# Patient Record
Sex: MALE | Race: OTHER | Employment: FULL TIME | URBAN - METROPOLITAN AREA
[De-identification: names, ages, dates, MRNs, and addresses within clinical notes are randomized per-mention and may not be internally consistent; named-entity substitution may affect disease eponyms.]

---

## 2017-02-21 ENCOUNTER — OFFICE VISIT (OUTPATIENT)
Dept: SURGERY | Age: 32
End: 2017-02-21

## 2017-02-21 VITALS
BODY MASS INDEX: 37.66 KG/M2 | DIASTOLIC BLOOD PRESSURE: 74 MMHG | SYSTOLIC BLOOD PRESSURE: 129 MMHG | HEART RATE: 56 BPM | HEIGHT: 71 IN | OXYGEN SATURATION: 100 % | WEIGHT: 269 LBS

## 2017-02-21 DIAGNOSIS — Z98.84 S/P BARIATRIC SURGERY: ICD-10-CM

## 2017-02-21 DIAGNOSIS — K90.9 INTESTINAL MALABSORPTION, UNSPECIFIED TYPE: Primary | ICD-10-CM

## 2017-02-21 DIAGNOSIS — K21.9 GASTROESOPHAGEAL REFLUX DISEASE WITHOUT ESOPHAGITIS: ICD-10-CM

## 2017-02-21 RX ORDER — OMEPRAZOLE 40 MG/1
40 CAPSULE, DELAYED RELEASE ORAL DAILY
Qty: 30 CAP | Refills: 3 | Status: SHIPPED | OUTPATIENT
Start: 2017-02-21 | End: 2017-08-11 | Stop reason: SDUPTHER

## 2017-02-21 RX ORDER — CYANOCOBALAMIN 1000 UG/ML
1000 INJECTION, SOLUTION INTRAMUSCULAR; SUBCUTANEOUS
Qty: 3 ML | Refills: 4 | Status: SHIPPED | OUTPATIENT
Start: 2017-02-21 | End: 2019-06-19 | Stop reason: SDUPTHER

## 2017-02-21 NOTE — PATIENT INSTRUCTIONS
I will call you about labs  Restart B12 injections monthly. Keep eating regularly. Body Mass Index: Care Instructions  Your Care Instructions    Body mass index (BMI) can help you see if your weight is raising your risk for health problems. It uses a formula to compare how much you weigh with how tall you are. A BMI between 18.5 and 24.9 is considered healthy. A BMI between 25 and 29.9 is considered overweight. A BMI of 30 or higher is considered obese. If your BMI is in the normal range, it means that you have a lower risk for weight-related health problems. If your BMI is in the overweight or obese range, you may be at increased risk for weight-related health problems, such as high blood pressure, heart disease, stroke, arthritis or joint pain, and diabetes. BMI is just one measure of your risk for weight-related health problems. You may be at higher risk for health problems if you are not active, you eat an unhealthy diet, or you drink too much alcohol or use tobacco products. Follow-up care is a key part of your treatment and safety. Be sure to make and go to all appointments, and call your doctor if you are having problems. It's also a good idea to know your test results and keep a list of the medicines you take. How can you care for yourself at home? · Practice healthy eating habits. This includes eating plenty of fruits, vegetables, whole grains, lean protein, and low-fat dairy. · Get at least 30 minutes of exercise 5 days a week or more. Brisk walking is a good choice. You also may want to do other activities, such as running, swimming, cycling, or playing tennis or team sports. · Do not smoke. Smoking can increase your risk for health problems. If you need help quitting, talk to your doctor about stop-smoking programs and medicines. These can increase your chances of quitting for good. · Limit alcohol to 2 drinks a day for men and 1 drink a day for women.  Too much alcohol can cause health problems. If you have a BMI higher than 25  · Your doctor may do other tests to check your risk for weight-related health problems. This may include measuring the distance around your waist. A waist measurement of more than 40 inches in men or 35 inches in women can increase the risk of weight-related health problems. · Talk with your doctor about steps you can take to stay healthy or improve your health. You may need to make lifestyle changes to lose weight and stay healthy, such as changing your diet and getting regular exercise. Where can you learn more? Go to http://niko-jasmin.info/. Enter S176 in the search box to learn more about \"Body Mass Index: Care Instructions. \"  Current as of: February 16, 2016  Content Version: 11.1  © 1939-7695 [employer], [employer]. Care instructions adapted under license by [employer] (which disclaims liability or warranty for this information). If you have questions about a medical condition or this instruction, always ask your healthcare professional. [place] [address] any warranty or liability for your use of this information.

## 2017-02-21 NOTE — PROGRESS NOTES
Subjective: Ivy Bui  is a 27 y.o. male who presents for follow-up about 11 months following laparoscopic sleeve gastrectomy. Surgery related complication: NA. He has lost a total of 166 pounds since surgery. Body mass index is 37.52 kg/(m^2). [place] Po Loss of EBW is 60%. The patient presents today to assess their progress toward their weight loss goal & to address any issues that may be present:   He is tolerating solid foods without difficulty and denies vomiting and abdominal pain. Remains on omeprazole - trial off resulted in return of symptoms. Fluid intake:  good                              Protein intake:  good  Eating much more regularly. The patient is taking MVI, but no B12. The patient's exercise level: moderately active. Active job @ [employer]. Changes in his medical history and medications have been reviewed:  Continues with back pain - ortho has sent him to pain management. Had steroid injection in back last week. Uses flexeril prn. Patient Active Problem List   Diagnosis Code    Morbid obesity with body mass index of 60.0-69.9 in adult ([facility] E66.01, Z68.44    Back pain M54.9    Smoking history Z87.891    Strabismus H50.9    GERD (gastroesophageal reflux disease) K21.9    Morbid obesity with BMI of 60.0-69.9, adult ([facility] E66.01, Z68.44    S/P bariatric surgery Z98.84    Intestinal malabsorption K90.9     Past Medical History:   Diagnosis Date    Back pain     daily    GERD (gastroesophageal reflux disease)     Intestinal malabsorption     Morbid obesity with body mass index of 60.0-69.9 in adult ([facility]     S/P bariatric surgery     Smoking history     quit 2013    Strabismus      Past Surgical History:   Procedure Laterality Date    HX GI      gastric sleeve    HX WISDOM TEETH EXTRACTION       Current Outpatient Prescriptions   Medication Sig Dispense Refill    CYCLOBENZAPRINE HCL (FLEXERIL PO) Take 10 mg by mouth as needed.       Syringe with Needle, Disp, 3 mL 25 x 5/8\" syrg 1 mL by SubCUTAneous route every thirty (30) days. 15 Syringe 0    cyanocobalamin (VITAMIN B12) 1,000 mcg/mL injection 1 mL by SubCUTAneous route every thirty (30) days. Indications: PREVENTION OF VITAMIN B12 DEFICIENCY 3 mL 4    omeprazole (PRILOSEC) 40 mg capsule Take 1 Cap by mouth daily. Indications: GASTROESOPHAGEAL REFLUX, HEARTBURN 30 Cap 3    multivitamin (ONE A DAY) tablet Take 1 Tab by mouth daily. Review of Systems:  General - Denies fatigue, fever, chills  Cardiac - Denies chest pain, palpitations, shortness of breath  Pulmonary - Denies shortness of breath, productive cough  GI - as noted above  Musculoskeletal - continues with back pain  Hematologic - Denies abnormal bleeding, bruising  Neurologic -  Denies weakness, paralysis, numbness, tingling    Objective:     Visit Vitals    /74 (BP 1 Location: Left arm, BP Patient Position: Sitting)    Pulse (!) 56    Ht 5' 11\" (1.803 m)    Wt 122 kg (269 lb)    SpO2 100%    BMI 37.52 kg/m2        Physical Exam:    General:  alert, cooperative, no distress, appears stated age   Lungs:   clear to auscultation bilaterally. Respiratory effort appropriate. Heart:  Regular rate and rhythm   Abdomen:   abdomen is soft without  tenderness, masses, organomegaly or guarding; Active bowel sounds all 4 quadrants. No hernia present. Incisions:  healed       Labs:     No 6m labs. Got bariatric labs drawn yesterday @ [facility] - no results yet. Assessment:     1. History of Morbid obesity, status post  laparoscopic sleeve gastrectomy. Doing well - with excellent wt loss and improved intake, but not taking B12.  2.  GERD - controlled with omeprazole    Plan:       1. Today the patient & I discussed their weight loss and reviewed their diet - including how appropriate their food choices are.   2. To continue focus on hydration. 3. Reminded to measure portions, continue high protein, low carbohydrate diet.  Reminded to continue to eat regularly. Dietician support offered. 4. Reminded of importance of vitamin supplements. To restart B12 injections monthly. E-prescribed. 5. To continue to increase activity. 6. Encouraged attendance @ support group  7. To continue current rx. To continue follow-up with PCP. 8. We will call with lab results when available. 9. I have discussed this plan and education material with patient. Pt verbalized understanding. 10. To follow-up in 3 month(s). To call if questions/concerns prior to office visit. 11. Total time spent with pt - 25 minutes    Mr. Yoly Ross has a reminder for a \"due or due soon\" health maintenance. I have asked that he contact his primary care provider for follow-up on this health maintenance.

## 2017-02-21 NOTE — MR AVS SNAPSHOT
Visit Information Date & Time Provider Department Dept. Phone Encounter #  
 2/21/2017 10:00 AM Tevin Buenrostro NP [facility] Jaky Yoon [id number] Follow-up Instructions Return in about 3 months (around 5/21/2017). Upcoming Health Maintenance Date Due DTaP/Tdap/Td series (1 - Tdap) 10/11/2005 INFLUENZA AGE 9 TO ADULT 8/1/2016 Allergies as of 2/21/2017  Review Complete On: 2/21/2017 By: Tevin Buenrostro NP No Known Allergies Current Immunizations  Never Reviewed No immunizations on file. Not reviewed this visit Vitals BP Pulse Height(growth percentile) Weight(growth percentile) SpO2 BMI  
 129/74 (BP 1 Location: Left arm, BP Patient Position: Sitting) (!) 56 5' 11\" (1.803 m) 269 lb (122 kg) 100% 37.52 kg/m2 Smoking Status Former Smoker Vitals History BMI and BSA Data Body Mass Index Body Surface Area  
 37.52 kg/m 2 2.47 m 2 Preferred Pharmacy Pharmacy Name Phone [id number], [address] [place] [address] Your Updated Medication List  
  
   
This list is accurate as of: 2/21/17 11:08 AM.  Always use your most recent med list.  
  
  
  
  
 cyanocobalamin 1,000 mcg/mL injection Commonly known as:  VITAMIN B12  
1 mL by SubCUTAneous route every thirty (30) days. Indications: PREVENTION OF VITAMIN B12 DEFICIENCY FLEXERIL PO Take 10 mg by mouth as needed. multivitamin tablet Commonly known as:  ONE A DAY Take 1 Tab by mouth daily. omeprazole 40 mg capsule Commonly known as:  PRILOSEC Take 1 Cap by mouth daily. Indications: GASTROESOPHAGEAL REFLUX, HEARTBURN Syringe with Needle (Disp) 3 mL 25 x 5/8\" Syrg 1 mL by SubCUTAneous route every thirty (30) days. Prescriptions Sent to Pharmacy Refills  Syringe with Needle, Disp, 3 mL 25 x 5/8\" syrg 0  
 Si mL by SubCUTAneous route every thirty (30) days. Class: Normal  
 [facility] [address] [place] [place] [address] [phone] Route: SubCUTAneous  
 cyanocobalamin (VITAMIN B12) 1,000 mcg/mL injection 4 Si mL by SubCUTAneous route every thirty (30) days. Indications: PREVENTION OF VITAMIN B12 DEFICIENCY Class: Normal  
 Pharmacy: [address] [place] [address] #: [phone] Route: SubCUTAneous  
 omeprazole (PRILOSEC) 40 mg capsule 3 Sig: Take 1 Cap by mouth daily. Indications: GASTROESOPHAGEAL REFLUX, HEARTBURN Class: Normal  
 Pharmacy: [address] [place] [address] #: [phone] Route: Oral  
  
Follow-up Instructions Return in about 3 months (around 2017). Patient Instructions I will call you about labs Restart B12 injections monthly. Keep eating regularly. Body Mass Index: Care Instructions Your Care Instructions Body mass index (BMI) can help you see if your weight is raising your risk for health problems. It uses a formula to compare how much you weigh with how tall you are. A BMI between 18.5 and 24.9 is considered healthy. A BMI between 25 and 29.9 is considered overweight. A BMI of 30 or higher is considered obese. If your BMI is in the normal range, it means that you have a lower risk for weight-related health problems. If your BMI is in the overweight or obese range, you may be at increased risk for weight-related health problems, such as high blood pressure, heart disease, stroke, arthritis or joint pain, and diabetes. BMI is just one measure of your risk for weight-related health problems. You may be at higher risk for health problems if you are not active, you eat an unhealthy diet, or you drink too much alcohol or use tobacco products. Follow-up care is a key part of your treatment and safety.  Be sure to make and go to all appointments, and call your doctor if you are having problems. It's also a good idea to know your test results and keep a list of the medicines you take. How can you care for yourself at home? · Practice healthy eating habits. This includes eating plenty of fruits, vegetables, whole grains, lean protein, and low-fat dairy. · Get at least 30 minutes of exercise 5 days a week or more. Brisk walking is a good choice. You also may want to do other activities, such as running, swimming, cycling, or playing tennis or team sports. · Do not smoke. Smoking can increase your risk for health problems. If you need help quitting, talk to your doctor about stop-smoking programs and medicines. These can increase your chances of quitting for good. · Limit alcohol to 2 drinks a day for men and 1 drink a day for women. Too much alcohol can cause health problems. If you have a BMI higher than 25 · Your doctor may do other tests to check your risk for weight-related health problems. This may include measuring the distance around your waist. A waist measurement of more than 40 inches in men or 35 inches in women can increase the risk of weight-related health problems. · Talk with your doctor about steps you can take to stay healthy or improve your health. You may need to make lifestyle changes to lose weight and stay healthy, such as changing your diet and getting regular exercise. Where can you learn more? Go to http://niko-jasmin.info/. Enter S176 in the search box to learn more about \"Body Mass Index: Care Instructions. \" Current as of: February 16, 2016 Content Version: 11.1 © 9252-7570 Healthwise, Incorporated. Care instructions adapted under license by [employer] (which disclaims liability or warranty for this information).  If you have questions about a medical condition or this instruction, always ask your healthcare professional. [employer] disclaims any warranty or liability for your use of this information. Introducing hospitals & HEALTH SERVICES! Dear Abigail Weber: Thank you for requesting a Shareholder InSite account. Our records indicate that you already have an active Shareholder InSite account. You can access your account anytime at [email or web address] Did you know that you can access your hospital and ER discharge instructions at any time in Shareholder InSite? You can also review all of your test results from your hospital stay or ER visit. Additional Information If you have questions, please visit the Frequently Asked Questions section of the Shareholder InSite website at [email or web address] Remember, Shareholder InSite is NOT to be used for urgent needs. For medical emergencies, dial 911. Now available from your iPhone and Android! Please provide this summary of care documentation to your next provider. Your primary care clinician is listed as Jessi Yo. If you have any questions after today's visit, please call [phone].

## 2017-02-27 ENCOUNTER — TELEPHONE (OUTPATIENT)
Dept: SURGERY | Age: 32
End: 2017-02-27

## 2017-03-03 ENCOUNTER — TELEPHONE (OUTPATIENT)
Dept: SURGERY | Age: 32
End: 2017-03-03

## 2017-03-03 DIAGNOSIS — K90.9 INTESTINAL MALABSORPTION, UNSPECIFIED TYPE: ICD-10-CM

## 2017-03-03 DIAGNOSIS — Z98.84 S/P BARIATRIC SURGERY: ICD-10-CM

## 2017-03-03 RX ORDER — ERGOCALCIFEROL 1.25 MG/1
50000 CAPSULE ORAL 2 TIMES WEEKLY
Qty: 24 CAP | Refills: 1 | Status: SHIPPED | OUTPATIENT
Start: 2017-03-03 | End: 2017-11-11 | Stop reason: SDUPTHER

## 2017-05-22 ENCOUNTER — OFFICE VISIT (OUTPATIENT)
Dept: SURGERY | Age: 32
End: 2017-05-22

## 2017-05-22 VITALS
SYSTOLIC BLOOD PRESSURE: 113 MMHG | OXYGEN SATURATION: 100 % | BODY MASS INDEX: 34.58 KG/M2 | HEART RATE: 67 BPM | DIASTOLIC BLOOD PRESSURE: 58 MMHG | WEIGHT: 247 LBS | RESPIRATION RATE: 16 BRPM | HEIGHT: 71 IN

## 2017-05-22 DIAGNOSIS — Z98.84 S/P BARIATRIC SURGERY: ICD-10-CM

## 2017-05-22 DIAGNOSIS — K90.9 INTESTINAL MALABSORPTION, UNSPECIFIED TYPE: Primary | ICD-10-CM

## 2017-05-22 RX ORDER — CYANOCOBALAMIN 1000 UG/ML
1000 INJECTION, SOLUTION INTRAMUSCULAR; SUBCUTANEOUS ONCE
Qty: 6 VIAL | Refills: 1 | Status: SHIPPED | OUTPATIENT
Start: 2017-05-22 | End: 2017-05-22

## 2017-05-22 RX ORDER — NYSTATIN 100000 U/G
CREAM TOPICAL AS NEEDED
Qty: 15 G | Refills: 1 | Status: SHIPPED | OUTPATIENT
Start: 2017-05-22 | End: 2017-09-07

## 2017-05-22 NOTE — PROGRESS NOTES
1+ year follow-up    Subjective: Maira Darden  is a 27 y.o. male who presents for follow-up about 14 months following laparoscopic sleeve gastrectomy. He has lost a total of 188 pounds since surgery. Body mass index is 34.45 kg/(m^2). . EBWL is (68%). The patient presents today to assess their progress toward their goal of weight loss and to address any issues that may be present. Today the patient and I have reviewed their diet and how appropriate their food choices are. The following issues have been identified - pt here with C/O rash under pannus. Pain assessment - 0/10  . Surgery related complication: NA       He reports a worsening rash under pannus and denies vomiting, abdominal pain, diarrhea and difficulty breathing. The patient's exercise level: moderately active. Changes in his medical history and medications have been reviewed. Patient Active Problem List   Diagnosis Code    Morbid obesity with body mass index of 60.0-69.9 in adult ([facility] E66.01, Z68.44    Back pain M54.9    Smoking history Z87.891    Strabismus H50.9    GERD (gastroesophageal reflux disease) K21.9    Morbid obesity with BMI of 60.0-69.9, adult ([facility] E66.01, Z68.44    S/P bariatric surgery Z98.84    Intestinal malabsorption K90.9     Past Medical History:   Diagnosis Date    Back pain     daily    GERD (gastroesophageal reflux disease)     Intestinal malabsorption     Morbid obesity with body mass index of 60.0-69.9 in adult ([facility]     S/P bariatric surgery     Smoking history     quit 2013    Strabismus      Past Surgical History:   Procedure Laterality Date    HX GI      gastric sleeve    HX WISDOM TEETH EXTRACTION       Current Outpatient Prescriptions   Medication Sig Dispense Refill    BIOTIN PO Take  by mouth.  TRAMADOL HCL (TRAMADOL PO) Take  by mouth.  nystatin (MYCOSTATIN) topical cream Apply  to affected area as needed for Skin Irritation.  15 g 1    Syringe with Needle, Disp, 3 mL 25 x 5/8\" syrg 1 Units by Does Not Apply route every month. 6 Syringe 1    cyanocobalamin (VITAMIN B12) 1,000 mcg/mL injection 1 mL by IntraMUSCular route once for 1 dose. 6 Vial 1    ergocalciferol (VITAMIN D2) 50,000 unit capsule Take 1 Cap by mouth two (2) times a week for 180 days. Indications: VITAMIN D DEFICIENCY 24 Cap 1    CYCLOBENZAPRINE HCL (FLEXERIL PO) Take 10 mg by mouth as needed.  Syringe with Needle, Disp, 3 mL 25 x 5/8\" syrg 1 mL by SubCUTAneous route every thirty (30) days. 15 Syringe 0    cyanocobalamin (VITAMIN B12) 1,000 mcg/mL injection 1 mL by SubCUTAneous route every thirty (30) days. Indications: PREVENTION OF VITAMIN B12 DEFICIENCY 3 mL 4    omeprazole (PRILOSEC) 40 mg capsule Take 1 Cap by mouth daily. Indications: GASTROESOPHAGEAL REFLUX, HEARTBURN 30 Cap 3    multivitamin (ONE A DAY) tablet Take 1 Tab by mouth daily.          Review of Symptoms:     General - No history or complaints of unexpected fever or chills  Head/Neck - No history or complaints of headache or dizziness  Cardiac - No history or complaints of chest pain, palpitations, or shortness of breath  Pulmonary - No history or complaints of shortness of breath or productive cough  Gastrointestinal - as noted above  Genitourinary - No history or complaints of hematuria/dysuria or renal lithiasis  Musculoskeletal - No history or complaints of joint  muscular weakness  Hematologic - No history of any bleeding episodes  Neurologic - No history or complaints of  migraine headaches or neurologic symptoms    Objective:     Visit Vitals    /58 (BP 1 Location: Left arm, BP Patient Position: Sitting)    Pulse 67    Resp 16    Ht 5' 11\" (1.803 m)    Wt 112 kg (247 lb)    SpO2 100%    BMI 34.45 kg/m2        Physical Exam:    General:  alert, cooperative, no distress, appears stated age   Lungs:   clear to auscultation bilaterally   Heart:  Regular rate and rhythm   Abdomen:   Noninfected rash under entire pannus without evidence of skin breakdown, abdomen is soft without significant tenderness, masses, organomegaly or guarding; Incisions: healing well, no significant drainage         Labs:     No results found for this or any previous visit (from the past 2016 hour(s)). Assessment:     1. History of Morbid obesity, status post  laparoscopic sleeve gastrectomy. Doing well, no concerns. He desires another 25 lb of weight loss. Labs from earlier this year reviewed via care everywhere from [facility]. He is already addressing his low Vit D. We have discussed his rash and treatment options. Nystatin cream given today. Discussed the possible need for plastic surgery in the future. B12 injections sent to pharmacy     Plan:     1. Remember to measure portions, continue low carbohydrate diet  2. Reviewed labs and appropriate changes made to vitamin regimen  3. Remember vitamin supplements - The importance of such was discussed regarding the malabsorptive issues that the surgery creates  4. Exercise regimen appears adequate. 5. Attend support group  6. Follow-up in 10 month(s). 7. The patient understands the plan of action  8. Total time spent with the patient 30 minutes.

## 2017-07-25 ENCOUNTER — OFFICE VISIT (OUTPATIENT)
Dept: SURGERY | Age: 32
End: 2017-07-25

## 2017-07-25 VITALS
HEIGHT: 71 IN | HEART RATE: 48 BPM | WEIGHT: 245.9 LBS | OXYGEN SATURATION: 100 % | SYSTOLIC BLOOD PRESSURE: 121 MMHG | DIASTOLIC BLOOD PRESSURE: 58 MMHG | BODY MASS INDEX: 34.43 KG/M2

## 2017-07-25 DIAGNOSIS — K21.9 GASTROESOPHAGEAL REFLUX DISEASE WITHOUT ESOPHAGITIS: ICD-10-CM

## 2017-07-25 DIAGNOSIS — Z98.84 S/P BARIATRIC SURGERY: ICD-10-CM

## 2017-07-25 DIAGNOSIS — R23.8 SKIN IRRITATION: ICD-10-CM

## 2017-07-25 DIAGNOSIS — E55.9 HYPOVITAMINOSIS D: ICD-10-CM

## 2017-07-25 DIAGNOSIS — K90.9 INTESTINAL MALABSORPTION, UNSPECIFIED TYPE: Primary | ICD-10-CM

## 2017-07-25 RX ORDER — TIZANIDINE 4 MG/1
4 TABLET ORAL
COMMUNITY
Start: 2017-07-18 | End: 2017-09-07

## 2017-07-25 RX ORDER — IBUPROFEN 200 MG
CAPSULE ORAL DAILY
COMMUNITY
End: 2018-01-04

## 2017-07-25 RX ORDER — NYSTATIN 100000 [USP'U]/G
POWDER TOPICAL 4 TIMES DAILY
Qty: 30 G | Refills: 5 | Status: SHIPPED | OUTPATIENT
Start: 2017-07-25 | End: 2017-09-07

## 2017-07-25 NOTE — PROGRESS NOTES
Subjective: Magen Moore  is a 27 y.o. male who presents for follow-up about 16 months following laparoscopic sleeve gastrectomy. Surgery related complication: NA. He has lost a total of 190 pounds since surgery. Body mass index is 34.3 kg/(m^2). [place] Loss of EBW is 69%. The patient presents today to assess their progress toward their weight loss goal & to address any issues that may be present:   He continues to have issues with skin irritation under his abdominal pannus and around navel despite use of nystatin cream. Wondering if he can also try nystatin powder as he works outside often and sweats a lot - states that the cream gets runny. He is tolerating solid foods without difficulty and denies vomiting and abdominal pain. Omeprazole effective in controlling reflux symptoms. Trial off resulted in return of symptoms. Fluid intake:  good                              Protein intake:  good - food + protein donn. Eating regularly. The patient is taking recommended vitamins. The patient's exercise level: moderately active. Changes in his medical history and medications have been reviewed:  Continues under the care of pain management for his back pain. Does desire abdominoplasty in future.     Patient Active Problem List   Diagnosis Code    Morbid obesity with body mass index of 60.0-69.9 in adult ([facility] E66.01, Z68.44    Back pain M54.9    Smoking history Z87.891    Strabismus H50.9    GERD (gastroesophageal reflux disease) K21.9    Morbid obesity with BMI of 60.0-69.9, adult ([facility] E66.01, Z68.44    S/P bariatric surgery Z98.84    Intestinal malabsorption K90.9     Past Medical History:   Diagnosis Date    Back pain     daily    GERD (gastroesophageal reflux disease)     Intestinal malabsorption     Morbid obesity with body mass index of 60.0-69.9 in adult [facility])     S/P bariatric surgery     Smoking history     quit 2013    Strabismus      Past Surgical History:   Procedure Laterality Date    HX GI      gastric sleeve    HX WISDOM TEETH EXTRACTION       Current Outpatient Prescriptions   Medication Sig Dispense Refill    calcium citrate 200 mg (950 mg) tablet Take  by mouth daily.  tiZANidine (ZANAFLEX) 4 mg tablet 4 mg.  nystatin (MYCOSTATIN) powder Apply  to affected area four (4) times daily. 30 g 5    BIOTIN PO Take  by mouth.  TRAMADOL HCL (TRAMADOL PO) Take  by mouth.  nystatin (MYCOSTATIN) topical cream Apply  to affected area as needed for Skin Irritation. 15 g 1    Syringe with Needle, Disp, 3 mL 25 x 5/8\" syrg 1 Units by Does Not Apply route every month. 6 Syringe 1    ergocalciferol (VITAMIN D2) 50,000 unit capsule Take 1 Cap by mouth two (2) times a week for 180 days. Indications: VITAMIN D DEFICIENCY 24 Cap 1    Syringe with Needle, Disp, 3 mL 25 x 5/8\" syrg 1 mL by SubCUTAneous route every thirty (30) days. 15 Syringe 0    cyanocobalamin (VITAMIN B12) 1,000 mcg/mL injection 1 mL by SubCUTAneous route every thirty (30) days. Indications: PREVENTION OF VITAMIN B12 DEFICIENCY 3 mL 4    omeprazole (PRILOSEC) 40 mg capsule Take 1 Cap by mouth daily. Indications: GASTROESOPHAGEAL REFLUX, HEARTBURN 30 Cap 3    multivitamin (ONE A DAY) tablet Take 1 Tab by mouth daily. Review of Systems:  General - Denies fatigue, fever, chills  Cardiac - Denies chest pain, palpitations, shortness of breath  Pulmonary - Denies shortness of breath, productive cough  GI - as noted above  Hematologic - Denies abnormal bleeding, bruising  Neurologic -  Denies weakness, paralysis, numbness, tingling    Objective:     Visit Vitals    /58 (BP 1 Location: Left arm, BP Patient Position: Sitting)    Pulse (!) 48    Ht 5' 11\" (1.803 m)    Wt 111.5 kg (245 lb 14.4 oz)    SpO2 100%    BMI 34.3 kg/m2        Physical Exam:    General:  alert, cooperative, no distress, appears stated age   Lungs:   clear to auscultation bilaterally.   Respiratory effort appropriate. Heart:  Regular rate and rhythm   Abdomen:   abdomen is soft without  tenderness, masses, organomegaly or guarding; Active bowel sounds all 4 quadrants. No hernia present. Both darkened and reddened skin noted under his abd pannus and around navel. No current breakdown noted. Incisions:  healed       Labs:     Yearly bariatric labs of 2/20/17 reviewed. Vit D - 18.5. Currently on replacement. Assessment:     1. History of Morbid obesity, status post  laparoscopic sleeve gastrectomy. Doing well with good wt loss  2. Skin irritation - persistent  3. GERD - controlled  4. Hypovit D - on replacement    Plan:       1. Today the patient & I discussed their weight loss and reviewed their diet - including how appropriate their food choices are.   2. To continue focus on hydration. 3. Reminded to measure portions, continue high protein, low carbohydrate diet. Reminded to eat regularly. Dietician support offered. 4. Reminded of importance of vitamin supplements. 5. To continue to increase activity. 6. Discussed his skin irritation. Will also try nystatin powder - e-prescribed. 7. Encouraged attendance @ support group  8. To continue current rx. To continue follow-up with PCP, specialists. May also need to see derm. 9. I have discussed this plan and education material with patient. Pt verbalized understanding. 10. To follow-up in 2 month(s). To call if questions/concerns prior to office visit. 11. Total time spent with pt - 25 minutes    Mr. Tosha Adrian has a reminder for a \"due or due soon\" health maintenance. I have asked that he contact his primary care provider for follow-up on this health maintenance.

## 2017-07-25 NOTE — PATIENT INSTRUCTIONS
Continue to monitor carbohydrate and protein intake. Eat regularly. Remember to stay hydrated  Continue to take vitamins  Continue to work towards exercise goals  Continue regular follow-up with PCP  Call office with any future questions or concerns         Walking for Exercise: Care Instructions  Your Care Instructions    Walking is one of the easiest ways to get the exercise you need for good health. A brisk, 30-minute walk each day can help you feel better and have more energy. It can help you lower your risk of disease. Walking can help you keep your bones strong and your heart healthy. Check with your doctor before you start a walking plan if you have heart problems, other health issues, or you have not been active in a long time. Follow your doctor's instructions for safe levels of exercise. Follow-up care is a key part of your treatment and safety. Be sure to make and go to all appointments, and call your doctor if you are having problems. It's also a good idea to know your test results and keep a list of the medicines you take. How can you care for yourself at home? Getting started  · Start slowly and set a short-term goal. For example, walk for 5 or 10 minutes every day. · Bit by bit, increase the amount you walk every day. Try for at least 30 minutes on most days of the week. You also may want to swim, bike, or do other activities. · If finding enough time is a problem, it is fine to be active in blocks of 10 minutes or more throughout your day and week. · To get the heart-healthy benefits of walking, you need to walk briskly enough to increase your heart rate and breathing, but not so fast that you cannot talk comfortably. · Wear comfortable shoes that fit well and provide good support for your feet and ankles. Staying with your plan  · After you've made walking a habit, set a longer-term goal. You may want to set a goal of walking briskly for longer or walking farther.  Experts say to do 2½ hours of moderate activity a week. A faster heartbeat is what defines moderate-level activity. · To stay motivated, walk with friends, coworkers, or pets. · Use a phone reilly or pedometer to track your steps each day. Set a goal to increase your steps. Once you get there, set a higher goal. Aim for 10,000 steps a day. · If the weather keeps you from walking outside, go for walks at the mall with a friend. Local schools and churches may have indoor gyms where you can walk. Fitting a walk into your workday  · Park several blocks away from work, or get off the bus a few stops early. · Use the stairs instead of the elevator, at least for a few floors. · Suggest holding meetings with colleagues during a walk inside or outside the building. · Use the restroom that is the farthest from your desk or workstation. · Use your morning and afternoon breaks to take quick 15-minute walks. Staying safe  · Know your surroundings. Walk in a well-lighted, safe place. If it is dark, walk with a partner. Wear light-colored clothing. If you can, buy a vest or jacket that reflects light. · Carry a cell phone for emergencies. · Drink plenty of water. Take a water bottle with you when you walk. This is very important if it is hot out. · Be careful not to slip on wet or icy ground. You can buy \"grippers\" for your shoes to help keep you from slipping. · Pay attention to your walking surface. Use sidewalks and paths. · If you have breathing problems like asthma or COPD, ask your doctor when it is safe for you to walk outdoors. Cold, dry air, smog, pollen, or other things in the air could cause breathing problems. Where can you learn more? Go to http://niko-jasmin.info/. Enter R159 in the search box to learn more about \"Walking for Exercise: Care Instructions. \"  Current as of: March 13, 2017  Content Version: 11.3  © 2391-1607 [employer].  Care instructions adapted under license by [employer] (which disclaims liability or warranty for this information). If you have questions about a medical condition or this instruction, always ask your healthcare professional. [address] any warranty or liability for your use of this information.

## 2017-07-27 PROBLEM — E55.9 HYPOVITAMINOSIS D: Status: ACTIVE | Noted: 2017-07-27

## 2017-08-12 RX ORDER — OMEPRAZOLE 40 MG/1
CAPSULE, DELAYED RELEASE ORAL
Qty: 30 CAP | Refills: 3 | Status: SHIPPED | OUTPATIENT
Start: 2017-08-12 | End: 2018-11-02 | Stop reason: SDUPTHER

## 2017-08-17 ENCOUNTER — NURSE NAVIGATOR (OUTPATIENT)
Dept: SURGERY | Age: 32
End: 2017-08-17

## 2017-09-07 ENCOUNTER — HOSPITAL ENCOUNTER (EMERGENCY)
Age: 32
Discharge: HOME | End: 2017-09-07
Attending: EMERGENCY MEDICINE
Payer: MEDICAID

## 2017-09-07 VITALS
TEMPERATURE: 98 F | RESPIRATION RATE: 16 BRPM | BODY MASS INDEX: 33.6 KG/M2 | SYSTOLIC BLOOD PRESSURE: 122 MMHG | OXYGEN SATURATION: 99 % | HEIGHT: 71 IN | HEART RATE: 56 BPM | DIASTOLIC BLOOD PRESSURE: 69 MMHG | WEIGHT: 240 LBS

## 2017-09-07 DIAGNOSIS — H66.92 ACUTE EAR INFECTION, LEFT: Primary | ICD-10-CM

## 2017-09-07 DIAGNOSIS — R42 DIZZINESS: ICD-10-CM

## 2017-09-07 PROCEDURE — 99282 EMERGENCY DEPT VISIT SF MDM: CPT

## 2017-09-07 RX ORDER — CIPROFLOXACIN AND DEXAMETHASONE 3; 1 MG/ML; MG/ML
4 SUSPENSION/ DROPS AURICULAR (OTIC) 2 TIMES DAILY
Qty: 7.5 ML | Refills: 0 | Status: SHIPPED | OUTPATIENT
Start: 2017-09-07 | End: 2017-09-27

## 2017-09-07 RX ORDER — AMOXICILLIN AND CLAVULANATE POTASSIUM 875; 125 MG/1; MG/1
1 TABLET, FILM COATED ORAL 2 TIMES DAILY
Qty: 14 TAB | Refills: 0 | Status: SHIPPED | OUTPATIENT
Start: 2017-09-07 | End: 2017-09-14

## 2017-09-07 NOTE — LETTER
[facility] [place] 
[facility] EMERGENCY DEPT 
[address] [address] 
[phone] Work/School Note Date: 9/7/2017 To Whom It May concern: Austen Wang was seen and treated today in the emergency room by the following provider(s): 
Attending Provider: Mark Albarran MD. Austen Wang may return to work on 9/8/17.  
 
Sincerely, 
 
 
 
 
Jasmyn Lagunas MD

## 2017-09-07 NOTE — DISCHARGE INSTRUCTIONS
Ear Infection (Otitis Media): Care Instructions  Your Care Instructions    An ear infection may start with a cold and affect the middle ear (otitis media). It can hurt a lot. Most ear infections clear up on their own in a couple of days. Most often you will not need antibiotics. This is because many ear infections are caused by a virus. Antibiotics don't work against a virus. Regular doses of pain medicines are the best way to reduce your fever and help you feel better. Follow-up care is a key part of your treatment and safety. Be sure to make and go to all appointments, and call your doctor if you are having problems. It's also a good idea to know your test results and keep a list of the medicines you take. How can you care for yourself at home? · Take pain medicines exactly as directed. ¨ If the doctor gave you a prescription medicine for pain, take it as prescribed. ¨ If you are not taking a prescription pain medicine, take an over-the-counter medicine, such as acetaminophen (Tylenol), ibuprofen (Advil, Motrin), or naproxen (Aleve). Read and follow all instructions on the label. ¨ Do not take two or more pain medicines at the same time unless the doctor told you to. Many pain medicines have acetaminophen, which is Tylenol. Too much acetaminophen (Tylenol) can be harmful. · Plan to take a full dose of pain reliever before bedtime. Getting enough sleep will help you get better. · Try a warm, moist washcloth on the ear. It may help relieve pain. · If your doctor prescribed antibiotics, take them as directed. Do not stop taking them just because you feel better. You need to take the full course of antibiotics. When should you call for help? Call your doctor now or seek immediate medical care if:  · You have new or increasing ear pain. · You have new or increasing pus or blood draining from your ear. · You have a fever with a stiff neck or a severe headache.   Watch closely for changes in your health, and be sure to contact your doctor if:  · You have new or worse symptoms. · You are not getting better after taking an antibiotic for 2 days. Where can you learn more? Go to http://niko-jasmin.info/. Enter W142 in the search box to learn more about \"Ear Infection (Otitis Media): Care Instructions. \"  Current as of: May 4, 2017  Content Version: 11.3  © 6682-6191 [employer]. Care instructions adapted under license by [employer] (which disclaims liability or warranty for this information). If you have questions about a medical condition or this instruction, always ask your healthcare professional. [address] any warranty or liability for your use of this information.

## 2017-09-07 NOTE — ED PROVIDER NOTES
[address]  EMERGENCY DEPARTMENT HISTORY AND PHYSICAL EXAM       Date: 9/7/2017   Patient Name: Lianet Lujan   YOB: 1985  Medical Record Number: [id number]    History of Presenting Illness     Chief Complaint   Patient presents with    Ear Pain        History Provided By:  patient    Additional History: 9:11 AM  Lianet Lujan is a 27 y.o. male who presents to the emergency department C/O left ear pain onset 2-3 days ago. Associated sxs include dizziness that resolved PTA. PMHx includes GERD, strabismus, and intestinal malabsorption. PSHx gastric sleeve. Pt denies fever, sore throat, chest pain, SOB, sick contacts, scuba diving or flights and any other symptoms or complaints at this time. Primary Care Provider: Alejandra Shah MD   Specialist:    Past History     Past Medical History:   Past Medical History:   Diagnosis Date    Acid reflux     Back pain     daily    Back pain     GERD (gastroesophageal reflux disease)     Intestinal malabsorption     Morbid obesity with body mass index of 60.0-69.9 in adult [facility])     S/P bariatric surgery     Smoking history     quit 2013    Strabismus         Past Surgical History:   Past Surgical History:   Procedure Laterality Date    HX GI      gastric sleeve    HX WISDOM TEETH EXTRACTION          Family History:   No family history on file. Social History:   Social History   Substance Use Topics    Smoking status: Former Smoker     Packs/day: 1.00     Years: 13.00     Quit date: 3/5/2013    Smokeless tobacco: Never Used    Alcohol use No        Allergies:   No Known Allergies     Review of Systems   Review of Systems   Constitutional: Negative for chills and fever. HENT: Positive for ear pain (left). Negative for sore throat. Respiratory: Negative for shortness of breath. Cardiovascular: Negative for chest pain. All other systems reviewed and are negative.       Physical Exam  Vitals: 09/07/17 0906   BP: 122/69   Pulse: (!) 56   Resp: 16   Temp: 98 °F (36.7 °C)   SpO2: 99%   Weight: 108.9 kg (240 lb)   Height: 5' 11\" (1.803 m)       Physical Exam  Constitutional: Well appearing, no acute distress  Head: Normocephalic, Atraumatic  ENT: Tenderness of left ear canal with erythema and edema, TM bulging  Eyes: disconjugate gaze at baseline  Neck: Supple  Chest: Normal work of breathing and chest excursion bilaterally  Back: No evidence of trauma or deformity  Extremities: No evidence of trauma or deformity  Skin: Warm and dry  Neuro: Alert and appropriate  Psychiatric: Normal mood and affect    Diagnostic Study Results     Labs -    No results found for this or any previous visit (from the past 12 hour(s)). Radiologic Studies -  The following have been ordered and reviewed:   No orders to display       Medical Decision Making   I am the first provider for this patient. I reviewed the vital signs, available nursing notes, past medical history, past surgical history, family history and social history. Vital Signs-Reviewed the patient's vital signs. Patient Vitals for the past 12 hrs:   Temp Pulse Resp BP SpO2   09/07/17 0906 98 °F (36.7 °C) (!) 56 16 122/69 99 %       Pulse Oximetry Analysis - Normal 99% on RA. Old Medical Records: Nursing notes. Procedures:   Procedures    ED Course:  9:11 AM  Initial assessment performed. The patients presenting problems have been discussed, and they are in agreement with the care plan formulated and outlined with them. I have encouraged them to ask questions as they arise throughout their visit. Medications Given in the ED:  Medications - No data to display    Discharge Note:  9:21 AM  Pt has been reexamined. Patient has no new complaints, changes, or physical findings. Care plan outlined and precautions discussed. Results were reviewed with the patient.  All medications were reviewed with the patient; will d/c home with Augmentin and Ciprodex. All of pt's questions and concerns were addressed. Patient was instructed and agrees to follow up with ENT, as well as to return to the ED upon further deterioration. Patient is ready to go home. Diagnosis   Clinical Impression:   1. Acute ear infection, left    2. Dizziness         Follow-up Information     Follow up With Details Comments Contact Info    [facility] & [address]. Schedule an appointment as soon as possible for a visit in 2 days For ENT follow up. [facility] [address] [facility]    [facility] EMERGENCY DEPT Go to As needed, If symptoms worsen [address]  [phone]          Current Discharge Medication List      START taking these medications    Details   amoxicillin-clavulanate (AUGMENTIN) 875-125 mg per tablet Take 1 Tab by mouth two (2) times a day for 7 days. Qty: 14 Tab, Refills: 0      ciprofloxacin-dexamethasone (CIPRODEX) 0.3-0.1 % otic suspension Administer 4 Drops in left ear two (2) times a day. Qty: 7.5 mL, Refills: 0             _______________________________   Attestations: This note is prepared by Larisa Santoyo, acting as a Scribe for Geronimo Guevara MD on 9:10 AM on 9/7/2017 . Geronimo Guevara MD: The scribe's documentation has been prepared under my direction and personally reviewed by me in its entirety.   _______________________________

## 2017-09-15 ENCOUNTER — NURSE NAVIGATOR (OUTPATIENT)
Dept: SURGERY | Age: 32
End: 2017-09-15

## 2017-09-27 ENCOUNTER — OFFICE VISIT (OUTPATIENT)
Dept: SURGERY | Age: 32
End: 2017-09-27

## 2017-09-27 ENCOUNTER — PATIENT MESSAGE (OUTPATIENT)
Dept: SURGERY | Age: 32
End: 2017-09-27

## 2017-09-27 VITALS
BODY MASS INDEX: 31.9 KG/M2 | HEART RATE: 58 BPM | DIASTOLIC BLOOD PRESSURE: 55 MMHG | WEIGHT: 227.9 LBS | SYSTOLIC BLOOD PRESSURE: 113 MMHG | OXYGEN SATURATION: 100 % | HEIGHT: 71 IN | RESPIRATION RATE: 16 BRPM

## 2017-09-27 DIAGNOSIS — K90.9 INTESTINAL MALABSORPTION, UNSPECIFIED TYPE: Primary | ICD-10-CM

## 2017-09-27 DIAGNOSIS — K90.9 INTESTINAL MALABSORPTION, UNSPECIFIED TYPE: ICD-10-CM

## 2017-09-27 NOTE — PROGRESS NOTES
1.5 year follow-up    Subjective: Obinna Grossman  is a 27 y.o. male who presents for follow-up about 1.5 years following laparoscopic sleeve gastrectomy. He has lost a total of 208 pounds since surgery. Body mass index is 31.79 kg/(m^2). . EBWL is (76%). The patient presents today to assess their progress toward their goal of weight loss and to address any issues that may be present. Today the patient and I have reviewed their diet and how appropriate their food choices are. The following issues have been identified - pt here to discuss plastic surgery. Rash noted under pannus. Pain assessment - 0/10  . Surgery related complication: NA       He reports pannus rash and denies vomiting, abdominal pain, diarrhea and difficulty breathing. The patient's exercise level: moderately active. Changes in his medical history and medications have been reviewed. Patient Active Problem List   Diagnosis Code    Morbid obesity with body mass index of 60.0-69.9 in adult ([facility] E66.01, Z68.44    Back pain M54.9    Smoking history Z87.891    Strabismus H50.9    GERD (gastroesophageal reflux disease) K21.9    Morbid obesity with BMI of 60.0-69.9, adult ([facility] E66.01, Z68.44    S/P bariatric surgery Z98.84    Intestinal malabsorption K90.9    Hypovitaminosis D E55.9     Past Medical History:   Diagnosis Date    Acid reflux     Back pain     daily    Back pain     GERD (gastroesophageal reflux disease)     Intestinal malabsorption     Morbid obesity with body mass index of 60.0-69.9 in adult ([facility]     S/P bariatric surgery     Smoking history     quit 2013    Strabismus      Past Surgical History:   Procedure Laterality Date    HX GI      gastric sleeve    HX WISDOM TEETH EXTRACTION       Current Outpatient Prescriptions   Medication Sig Dispense Refill    TRAZODONE HCL (TRAZODONE PO) Take  by mouth.  omeprazole (PRILOSEC) 40 mg capsule TAKE 1 CAP BY MOUTH DAILY.  INDICATIONS: GASTROESOPHAGEAL REFLUX, HEARTBURN 30 Cap 3    calcium citrate 200 mg (950 mg) tablet Take  by mouth daily.  BIOTIN PO Take  by mouth.  TRAMADOL HCL (TRAMADOL PO) Take  by mouth as needed.  Syringe with Needle, Disp, 3 mL 25 x 5/8\" syrg 1 Units by Does Not Apply route every month. 6 Syringe 1    Syringe with Needle, Disp, 3 mL 25 x 5/8\" syrg 1 mL by SubCUTAneous route every thirty (30) days. 15 Syringe 0    cyanocobalamin (VITAMIN B12) 1,000 mcg/mL injection 1 mL by SubCUTAneous route every thirty (30) days. Indications: PREVENTION OF VITAMIN B12 DEFICIENCY 3 mL 4    multivitamin (ONE A DAY) tablet Take 1 Tab by mouth daily. Review of Symptoms:     General - No history or complaints of unexpected fever or chills  Head/Neck - No history or complaints of headache or dizziness  Cardiac - No history or complaints of chest pain, palpitations, or shortness of breath  Pulmonary - No history or complaints of shortness of breath or productive cough  Gastrointestinal - as noted above  Genitourinary - No history or complaints of hematuria/dysuria or renal lithiasis  Musculoskeletal - No history or complaints of joint  muscular weakness  Hematologic - No history of any bleeding episodes  Neurologic - No history or complaints of  migraine headaches or neurologic symptoms    Objective:     Visit Vitals    /55 (BP Patient Position: Sitting)    Pulse (!) 58    Resp 16    Ht 5' 11\" (1.803 m)    Wt 103.4 kg (227 lb 14.4 oz)    SpO2 100%    BMI 31.79 kg/m2        Physical Exam:    General:  alert, cooperative, no distress, appears stated age   Lungs:   clear to auscultation bilaterally   Heart:  Regular rate and rhythm   Abdomen:   abdomen is soft without significant tenderness, masses, organomegaly or guarding; rash under pannus   Incisions: healing well, no significant drainage         Labs:     No results found for this or any previous visit (from the past 2016 hour(s)). Assessment:     1. History of Morbid obesity, status post  laparoscopic sleeve gastrectomy. The patient has a rash under pannus and will benefit from abdominoplasty. Plan:     1. Remember to measure portions, continue low carbohydrate diet  2. Reviewed labs and appropriate changes made to vitamin regimen  3. Remember vitamin supplements. 4. Exercise regimen appears adequate. 5. Attend support group  6. Follow-up in 6 month(s). 7. The patient understands the plan of action  8. Total time spent with the patient 30 minutes.

## 2017-11-14 RX ORDER — ERGOCALCIFEROL 1.25 MG/1
CAPSULE ORAL
Qty: 24 CAP | Refills: 1 | Status: SHIPPED | OUTPATIENT
Start: 2017-11-14 | End: 2019-06-19

## 2017-11-26 DIAGNOSIS — K90.9 INTESTINAL MALABSORPTION, UNSPECIFIED TYPE: ICD-10-CM

## 2018-01-08 ENCOUNTER — HOSPITAL ENCOUNTER (OUTPATIENT)
Dept: PREADMISSION TESTING | Age: 33
Discharge: HOME | End: 2018-01-08
Payer: MEDICAID

## 2018-01-08 LAB
ANION GAP SERPL CALC-SCNC: 8 MMOL/L (ref 3–18)
ATRIAL RATE: 53 BPM
BUN SERPL-MCNC: 10 MG/DL (ref 7–18)
BUN/CREAT SERPL: 12 (ref 12–20)
CALCIUM SERPL-MCNC: 9.7 MG/DL (ref 8.5–10.1)
CALCULATED P AXIS, ECG09: 37 DEGREES
CALCULATED R AXIS, ECG10: 10 DEGREES
CALCULATED T AXIS, ECG11: 63 DEGREES
CHLORIDE SERPL-SCNC: 107 MMOL/L (ref 100–108)
CO2 SERPL-SCNC: 30 MMOL/L (ref 21–32)
CREAT SERPL-MCNC: 0.83 MG/DL (ref 0.6–1.3)
DIAGNOSIS, 93000: NORMAL
ERYTHROCYTE [DISTWIDTH] IN BLOOD BY AUTOMATED COUNT: 12.9 % (ref 11.6–14.5)
GLUCOSE SERPL-MCNC: 94 MG/DL (ref 74–99)
HCT VFR BLD AUTO: 42.8 % (ref 36–48)
HGB BLD-MCNC: 14.5 G/DL (ref 13–16)
MCH RBC QN AUTO: 29.2 PG (ref 24–34)
MCHC RBC AUTO-ENTMCNC: 33.9 G/DL (ref 31–37)
MCV RBC AUTO: 86.1 FL (ref 74–97)
P-R INTERVAL, ECG05: 148 MS
PLATELET # BLD AUTO: 233 K/UL (ref 135–420)
PMV BLD AUTO: 9.6 FL (ref 9.2–11.8)
POTASSIUM SERPL-SCNC: 5 MMOL/L (ref 3.5–5.5)
Q-T INTERVAL, ECG07: 416 MS
QRS DURATION, ECG06: 100 MS
QTC CALCULATION (BEZET), ECG08: 390 MS
RBC # BLD AUTO: 4.97 M/UL (ref 4.7–5.5)
SODIUM SERPL-SCNC: 145 MMOL/L (ref 136–145)
VENTRICULAR RATE, ECG03: 53 BPM
WBC # BLD AUTO: 4.8 K/UL (ref 4.6–13.2)

## 2018-01-08 PROCEDURE — 85027 COMPLETE CBC AUTOMATED: CPT | Performed by: PLASTIC SURGERY

## 2018-01-08 PROCEDURE — 93005 ELECTROCARDIOGRAM TRACING: CPT

## 2018-01-08 PROCEDURE — 80048 BASIC METABOLIC PNL TOTAL CA: CPT | Performed by: PLASTIC SURGERY

## 2018-01-08 PROCEDURE — 36415 COLL VENOUS BLD VENIPUNCTURE: CPT | Performed by: PLASTIC SURGERY

## 2018-01-17 ENCOUNTER — ANESTHESIA EVENT (OUTPATIENT)
Dept: SURGERY | Age: 33
End: 2018-01-17
Payer: MEDICAID

## 2018-01-18 ENCOUNTER — HOSPITAL ENCOUNTER (OUTPATIENT)
Age: 33
Setting detail: OUTPATIENT SURGERY
Discharge: HOME | End: 2018-01-18
Attending: PLASTIC SURGERY | Admitting: PLASTIC SURGERY
Payer: MEDICAID

## 2018-01-18 ENCOUNTER — ANESTHESIA (OUTPATIENT)
Dept: SURGERY | Age: 33
End: 2018-01-18
Payer: MEDICAID

## 2018-01-18 VITALS
TEMPERATURE: 98.2 F | RESPIRATION RATE: 16 BRPM | OXYGEN SATURATION: 100 % | BODY MASS INDEX: 32.21 KG/M2 | SYSTOLIC BLOOD PRESSURE: 120 MMHG | HEART RATE: 54 BPM | DIASTOLIC BLOOD PRESSURE: 62 MMHG | WEIGHT: 230.06 LBS | HEIGHT: 71 IN

## 2018-01-18 PROCEDURE — 76060000035 HC ANESTHESIA 2 TO 2.5 HR: Performed by: PLASTIC SURGERY

## 2018-01-18 PROCEDURE — 77030008462 HC STPLR SKN PROX J&J -A: Performed by: PLASTIC SURGERY

## 2018-01-18 PROCEDURE — 77030006643: Performed by: ANESTHESIOLOGY

## 2018-01-18 PROCEDURE — 77030008683 HC TU ET CUF COVD -A: Performed by: ANESTHESIOLOGY

## 2018-01-18 PROCEDURE — 77030031139 HC SUT VCRL2 J&J -A: Performed by: PLASTIC SURGERY

## 2018-01-18 PROCEDURE — 77030002966 HC SUT PDS J&J -A: Performed by: PLASTIC SURGERY

## 2018-01-18 PROCEDURE — 77030002986 HC SUT PROL J&J -A: Performed by: PLASTIC SURGERY

## 2018-01-18 PROCEDURE — 74011250636 HC RX REV CODE- 250/636: Performed by: ANESTHESIOLOGY

## 2018-01-18 PROCEDURE — 74011000250 HC RX REV CODE- 250

## 2018-01-18 PROCEDURE — 74011250636 HC RX REV CODE- 250/636

## 2018-01-18 PROCEDURE — 74011250637 HC RX REV CODE- 250/637: Performed by: ANESTHESIOLOGY

## 2018-01-18 PROCEDURE — 77030008477 HC STYL SATN SLP COVD -A: Performed by: ANESTHESIOLOGY

## 2018-01-18 PROCEDURE — 77030011265 HC ELECTRD BLD HEX COVD -A: Performed by: PLASTIC SURGERY

## 2018-01-18 PROCEDURE — 77030002933 HC SUT MCRYL J&J -A: Performed by: PLASTIC SURGERY

## 2018-01-18 PROCEDURE — 77030020782 HC GWN BAIR PAWS FLX 3M -B: Performed by: PLASTIC SURGERY

## 2018-01-18 PROCEDURE — 77030003029 HC SUT VCRL J&J -B: Performed by: PLASTIC SURGERY

## 2018-01-18 PROCEDURE — 77030013567 HC DRN WND RESERV BARD -A: Performed by: PLASTIC SURGERY

## 2018-01-18 PROCEDURE — 76210000021 HC REC RM PH II 0.5 TO 1 HR: Performed by: PLASTIC SURGERY

## 2018-01-18 PROCEDURE — 74011250636 HC RX REV CODE- 250/636: Performed by: PLASTIC SURGERY

## 2018-01-18 PROCEDURE — 76010000131 HC OR TIME 2 TO 2.5 HR: Performed by: PLASTIC SURGERY

## 2018-01-18 PROCEDURE — 77030018836 HC SOL IRR NACL ICUM -A: Performed by: PLASTIC SURGERY

## 2018-01-18 PROCEDURE — 74011000250 HC RX REV CODE- 250: Performed by: PLASTIC SURGERY

## 2018-01-18 PROCEDURE — 77030020407 HC IV BLD WRMR ST 3M -A: Performed by: ANESTHESIOLOGY

## 2018-01-18 PROCEDURE — 76210000016 HC OR PH I REC 1 TO 1.5 HR: Performed by: PLASTIC SURGERY

## 2018-01-18 PROCEDURE — 77030012058: Performed by: PLASTIC SURGERY

## 2018-01-18 RX ORDER — ACETAMINOPHEN 10 MG/ML
1000 INJECTION, SOLUTION INTRAVENOUS ONCE
Status: COMPLETED | OUTPATIENT
Start: 2018-01-18 | End: 2018-01-18

## 2018-01-18 RX ORDER — NALOXONE HYDROCHLORIDE 0.4 MG/ML
0.1 INJECTION, SOLUTION INTRAMUSCULAR; INTRAVENOUS; SUBCUTANEOUS AS NEEDED
Status: DISCONTINUED | OUTPATIENT
Start: 2018-01-18 | End: 2018-01-18 | Stop reason: HOSPADM

## 2018-01-18 RX ORDER — PROPOFOL 10 MG/ML
INJECTION, EMULSION INTRAVENOUS AS NEEDED
Status: DISCONTINUED | OUTPATIENT
Start: 2018-01-18 | End: 2018-01-18 | Stop reason: HOSPADM

## 2018-01-18 RX ORDER — OXYCODONE HYDROCHLORIDE 5 MG/1
5 TABLET ORAL ONCE
Status: COMPLETED | OUTPATIENT
Start: 2018-01-18 | End: 2018-01-18

## 2018-01-18 RX ORDER — CEFAZOLIN SODIUM 2 G/50ML
2 SOLUTION INTRAVENOUS ONCE
Status: COMPLETED | OUTPATIENT
Start: 2018-01-18 | End: 2018-01-18

## 2018-01-18 RX ORDER — HYDROMORPHONE HYDROCHLORIDE 1 MG/ML
0.5 INJECTION, SOLUTION INTRAMUSCULAR; INTRAVENOUS; SUBCUTANEOUS
Status: DISCONTINUED | OUTPATIENT
Start: 2018-01-18 | End: 2018-01-18 | Stop reason: HOSPADM

## 2018-01-18 RX ORDER — BACITRACIN 500 [USP'U]/G
OINTMENT TOPICAL AS NEEDED
Status: DISCONTINUED | OUTPATIENT
Start: 2018-01-18 | End: 2018-01-18 | Stop reason: HOSPADM

## 2018-01-18 RX ORDER — EPHEDRINE SULFATE/0.9% NACL/PF 25 MG/5 ML
SYRINGE (ML) INTRAVENOUS AS NEEDED
Status: DISCONTINUED | OUTPATIENT
Start: 2018-01-18 | End: 2018-01-18 | Stop reason: HOSPADM

## 2018-01-18 RX ORDER — SODIUM CHLORIDE, SODIUM LACTATE, POTASSIUM CHLORIDE, CALCIUM CHLORIDE 600; 310; 30; 20 MG/100ML; MG/100ML; MG/100ML; MG/100ML
1000 INJECTION, SOLUTION INTRAVENOUS CONTINUOUS
Status: DISCONTINUED | OUTPATIENT
Start: 2018-01-18 | End: 2018-01-18 | Stop reason: HOSPADM

## 2018-01-18 RX ORDER — FLUMAZENIL 0.1 MG/ML
0.2 INJECTION INTRAVENOUS
Status: DISCONTINUED | OUTPATIENT
Start: 2018-01-18 | End: 2018-01-18 | Stop reason: HOSPADM

## 2018-01-18 RX ORDER — LIDOCAINE HYDROCHLORIDE 20 MG/ML
INJECTION, SOLUTION EPIDURAL; INFILTRATION; INTRACAUDAL; PERINEURAL AS NEEDED
Status: DISCONTINUED | OUTPATIENT
Start: 2018-01-18 | End: 2018-01-18 | Stop reason: HOSPADM

## 2018-01-18 RX ORDER — ONDANSETRON 2 MG/ML
INJECTION INTRAMUSCULAR; INTRAVENOUS AS NEEDED
Status: DISCONTINUED | OUTPATIENT
Start: 2018-01-18 | End: 2018-01-18 | Stop reason: HOSPADM

## 2018-01-18 RX ORDER — FENTANYL CITRATE 50 UG/ML
INJECTION, SOLUTION INTRAMUSCULAR; INTRAVENOUS AS NEEDED
Status: DISCONTINUED | OUTPATIENT
Start: 2018-01-18 | End: 2018-01-18 | Stop reason: HOSPADM

## 2018-01-18 RX ORDER — DEXAMETHASONE SODIUM PHOSPHATE 4 MG/ML
INJECTION, SOLUTION INTRA-ARTICULAR; INTRALESIONAL; INTRAMUSCULAR; INTRAVENOUS; SOFT TISSUE AS NEEDED
Status: DISCONTINUED | OUTPATIENT
Start: 2018-01-18 | End: 2018-01-18 | Stop reason: HOSPADM

## 2018-01-18 RX ORDER — GLYCOPYRROLATE 0.2 MG/ML
INJECTION INTRAMUSCULAR; INTRAVENOUS AS NEEDED
Status: DISCONTINUED | OUTPATIENT
Start: 2018-01-18 | End: 2018-01-18 | Stop reason: HOSPADM

## 2018-01-18 RX ORDER — SODIUM CHLORIDE 0.9 % (FLUSH) 0.9 %
5-10 SYRINGE (ML) INJECTION AS NEEDED
Status: DISCONTINUED | OUTPATIENT
Start: 2018-01-18 | End: 2018-01-18 | Stop reason: HOSPADM

## 2018-01-18 RX ORDER — DEXTROSE 50 % IN WATER (D50W) INTRAVENOUS SYRINGE
25-50 AS NEEDED
Status: DISCONTINUED | OUTPATIENT
Start: 2018-01-18 | End: 2018-01-18 | Stop reason: HOSPADM

## 2018-01-18 RX ORDER — ROCURONIUM BROMIDE 10 MG/ML
INJECTION, SOLUTION INTRAVENOUS AS NEEDED
Status: DISCONTINUED | OUTPATIENT
Start: 2018-01-18 | End: 2018-01-18 | Stop reason: HOSPADM

## 2018-01-18 RX ORDER — INSULIN LISPRO 100 [IU]/ML
INJECTION, SOLUTION INTRAVENOUS; SUBCUTANEOUS ONCE
Status: DISCONTINUED | OUTPATIENT
Start: 2018-01-18 | End: 2018-01-18 | Stop reason: HOSPADM

## 2018-01-18 RX ORDER — MAGNESIUM SULFATE 100 %
4 CRYSTALS MISCELLANEOUS AS NEEDED
Status: DISCONTINUED | OUTPATIENT
Start: 2018-01-18 | End: 2018-01-18 | Stop reason: HOSPADM

## 2018-01-18 RX ORDER — NEOSTIGMINE METHYLSULFATE 5 MG/5 ML
SYRINGE (ML) INTRAVENOUS AS NEEDED
Status: DISCONTINUED | OUTPATIENT
Start: 2018-01-18 | End: 2018-01-18 | Stop reason: HOSPADM

## 2018-01-18 RX ORDER — MIDAZOLAM HYDROCHLORIDE 1 MG/ML
INJECTION, SOLUTION INTRAMUSCULAR; INTRAVENOUS AS NEEDED
Status: DISCONTINUED | OUTPATIENT
Start: 2018-01-18 | End: 2018-01-18 | Stop reason: HOSPADM

## 2018-01-18 RX ORDER — SODIUM CHLORIDE, SODIUM LACTATE, POTASSIUM CHLORIDE, CALCIUM CHLORIDE 600; 310; 30; 20 MG/100ML; MG/100ML; MG/100ML; MG/100ML
125 INJECTION, SOLUTION INTRAVENOUS CONTINUOUS
Status: DISCONTINUED | OUTPATIENT
Start: 2018-01-18 | End: 2018-01-18 | Stop reason: HOSPADM

## 2018-01-18 RX ADMIN — Medication 5 MG: at 08:55

## 2018-01-18 RX ADMIN — PROPOFOL 200 MG: 10 INJECTION, EMULSION INTRAVENOUS at 07:36

## 2018-01-18 RX ADMIN — FENTANYL CITRATE 50 MCG: 50 INJECTION, SOLUTION INTRAMUSCULAR; INTRAVENOUS at 09:50

## 2018-01-18 RX ADMIN — FENTANYL CITRATE 50 MCG: 50 INJECTION, SOLUTION INTRAMUSCULAR; INTRAVENOUS at 07:27

## 2018-01-18 RX ADMIN — CEFAZOLIN SODIUM 2 G: 2 SOLUTION INTRAVENOUS at 07:30

## 2018-01-18 RX ADMIN — ROCURONIUM BROMIDE 50 MG: 10 INJECTION, SOLUTION INTRAVENOUS at 07:36

## 2018-01-18 RX ADMIN — ONDANSETRON 4 MG: 2 INJECTION INTRAMUSCULAR; INTRAVENOUS at 09:36

## 2018-01-18 RX ADMIN — FENTANYL CITRATE 50 MCG: 50 INJECTION, SOLUTION INTRAMUSCULAR; INTRAVENOUS at 09:49

## 2018-01-18 RX ADMIN — Medication 5 MG: at 09:17

## 2018-01-18 RX ADMIN — FENTANYL CITRATE 50 MCG: 50 INJECTION, SOLUTION INTRAMUSCULAR; INTRAVENOUS at 07:33

## 2018-01-18 RX ADMIN — Medication 5 MG: at 08:51

## 2018-01-18 RX ADMIN — SODIUM CHLORIDE, SODIUM LACTATE, POTASSIUM CHLORIDE, AND CALCIUM CHLORIDE: 600; 310; 30; 20 INJECTION, SOLUTION INTRAVENOUS at 08:17

## 2018-01-18 RX ADMIN — GLYCOPYRROLATE 0.6 MG: 0.2 INJECTION INTRAMUSCULAR; INTRAVENOUS at 09:48

## 2018-01-18 RX ADMIN — ACETAMINOPHEN 1000 MG: 10 INJECTION, SOLUTION INTRAVENOUS at 07:57

## 2018-01-18 RX ADMIN — OXYCODONE HYDROCHLORIDE 5 MG: 5 TABLET ORAL at 11:37

## 2018-01-18 RX ADMIN — Medication 2.5 MG: at 09:48

## 2018-01-18 RX ADMIN — LIDOCAINE HYDROCHLORIDE 80 MG: 20 INJECTION, SOLUTION EPIDURAL; INFILTRATION; INTRACAUDAL; PERINEURAL at 07:36

## 2018-01-18 RX ADMIN — FENTANYL CITRATE 50 MCG: 50 INJECTION, SOLUTION INTRAMUSCULAR; INTRAVENOUS at 07:57

## 2018-01-18 RX ADMIN — SODIUM CHLORIDE, SODIUM LACTATE, POTASSIUM CHLORIDE, AND CALCIUM CHLORIDE 125 ML/HR: 600; 310; 30; 20 INJECTION, SOLUTION INTRAVENOUS at 06:31

## 2018-01-18 RX ADMIN — HYDROMORPHONE HYDROCHLORIDE 0.5 MG: 1 INJECTION, SOLUTION INTRAMUSCULAR; INTRAVENOUS; SUBCUTANEOUS at 10:19

## 2018-01-18 RX ADMIN — DEXAMETHASONE SODIUM PHOSPHATE 4 MG: 4 INJECTION, SOLUTION INTRA-ARTICULAR; INTRALESIONAL; INTRAMUSCULAR; INTRAVENOUS; SOFT TISSUE at 09:36

## 2018-01-18 RX ADMIN — SODIUM CHLORIDE, SODIUM LACTATE, POTASSIUM CHLORIDE, AND CALCIUM CHLORIDE 125 ML/HR: 600; 310; 30; 20 INJECTION, SOLUTION INTRAVENOUS at 10:03

## 2018-01-18 RX ADMIN — MIDAZOLAM HYDROCHLORIDE 2 MG: 1 INJECTION, SOLUTION INTRAMUSCULAR; INTRAVENOUS at 07:27

## 2018-01-18 NOTE — IP AVS SNAPSHOT
[address] [address] Patient: Jhonatan Helms MRN: [id number] :1985 About your hospitalization You were admitted on:  2018 You last received care in the:  [facility] PHASE 2 RECOVERY You were discharged on:  2018 Why you were hospitalized Your primary diagnosis was:  Not on File Follow-up Information Follow up With Details Comments Contact Info Ai Weinberg MD   [place] [address] 
[phone] Iram Terry MD Call today Call office to schedule for an appt  [phone] [facility] [address] [place] [address] 
[phone] Your Scheduled Appointments 2018 10:00 AM EDT Office Visit with Silvana Osman MD  
[facility] Carlos Arora ([facility])  
 [address] [facility] 305 1700 [facility]  
[phone] Discharge Orders None A check tanya indicates which time of day the medication should be taken. My Medications CONTINUE taking these medications Instructions Each Dose to Equal  
 Morning Noon Evening Bedtime BIOTIN PO Your last dose was: Your next dose is: Take 5,000 mcg by mouth daily. 5000 mcg  
    
   
   
   
  
 cyanocobalamin 1,000 mcg/mL injection Commonly known as:  VITAMIN B12 Your last dose was: Your next dose is:    
   
   
 1 mL by SubCUTAneous route every thirty (30) days. Indications: PREVENTION OF VITAMIN B12 DEFICIENCY  
 1000 mcg  
    
   
   
   
  
 ergocalciferol 50,000 unit capsule Commonly known as:  ERGOCALCIFEROL Your last dose was: Your next dose is: TAKE ONE CAPSULE BY MOUTH TWICE A WEEK  
     
   
   
   
  
 multivitamin tablet Commonly known as:  ONE A DAY Your last dose was: Your next dose is: Take 1 Tab by mouth daily. 1 Tab  
    
   
   
   
  
 omeprazole 40 mg capsule Commonly known as:  PRILOSEC Your last dose was: Your next dose is: TAKE 1 CAP BY MOUTH DAILY. INDICATIONS: GASTROESOPHAGEAL REFLUX, HEARTBURN  
     
   
   
   
  
 * Syringe with Needle (Disp) 3 mL 25 x 5/8\" Syrg Your last dose was: Your next dose is:    
   
   
 1 mL by SubCUTAneous route every thirty (30) days. 1 mL * Syringe with Needle (Disp) 3 mL 25 x 5/8\" Syrg Your last dose was: Your next dose is:    
   
   
 1 Units by Does Not Apply route every month. 1 Units * Notice: This list has 2 medication(s) that are the same as other medications prescribed for you. Read the directions carefully, and ask your doctor or other care provider to review them with you. Discharge Instructions DISCHARGE SUMMARY from Nurse PATIENT INSTRUCTIONS: 
 
After general anesthesia or intravenous sedation, for 24 hours or while taking prescription Narcotics: · Limit your activities · Do not drive and operate hazardous machinery · Do not make important personal or business decisions · Do  not drink alcoholic beverages · If you have not urinated within 8 hours after discharge, please contact your surgeon on call. Report the following to your surgeon: 
· Excessive pain, swelling, redness or odor of or around the surgical area · Temperature over 100.5 · Nausea and vomiting lasting longer than 4 hours or if unable to take medications · Any signs of decreased circulation or nerve impairment to extremity: change in color, persistent  numbness, tingling, coldness or increase pain · Any questions What to do at Home: 
Recommended activity: Activity as tolerated, Ambulate in house, No heavy lifting, pushing, pulling and No driving while on analgesics,  
 
 If you experience any of the following symptoms fever, chills, uncontrollable pain not relieved with pain meds/ or rest, persistent nausea and vomiting, foul drainage from incision, and increased swelling and redness notify dr Eric Escobar *  Please give a list of your current medications to your Primary Care Provider. *  Please update this list whenever your medications are discontinued, doses are 
    changed, or new medications (including over-the-counter products) are added. *  Please carry medication information at all times in case of emergency situations. These are general instructions for a healthy lifestyle: No smoking/ No tobacco products/ Avoid exposure to second hand smoke Surgeon General's Warning:  Quitting smoking now greatly reduces serious risk to your health. Obesity, smoking, and sedentary lifestyle greatly increases your risk for illness A healthy diet, regular physical exercise & weight monitoring are important for maintaining a healthy lifestyle You may be retaining fluid if you have a history of heart failure or if you experience any of the following symptoms:  Weight gain of 3 pounds or more overnight or 5 pounds in a week, increased swelling in our hands or feet or shortness of breath while lying flat in bed. Please call your doctor as soon as you notice any of these symptoms; do not wait until your next office visit. Recognize signs and symptoms of STROKE: 
 
F-face looks uneven A-arms unable to move or move unevenly S-speech slurred or non-existent T-time-call 911 as soon as signs and symptoms begin-DO NOT go Back to bed or wait to see if you get better-TIME IS BRAIN. Warning Signs of HEART ATTACK Call 911 if you have these symptoms: 
? Chest discomfort.  Most heart attacks involve discomfort in the center of the chest that lasts more than a few minutes, or that goes away and comes back. It can feel like uncomfortable pressure, squeezing, fullness, or pain. ? Discomfort in other areas of the upper body. Symptoms can include pain or discomfort in one or both arms, the back, neck, jaw, or stomach. ? Shortness of breath with or without chest discomfort. ? Other signs may include breaking out in a cold sweat, nausea, or lightheadedness. Don't wait more than five minutes to call [address]! Fast action can save your life. Calling 911 is almost always the fastest way to get lifesaving treatment. Emergency Medical Services staff can begin treatment when they arrive  up to an hour sooner than if someone gets to the hospital by car. The discharge information has been reviewed with the patient and caregiver. The patient and caregiver verbalized understanding. Discharge medications reviewed with the patient and caregiver and appropriate educational materials and side effects teaching were provided Patient armband removed and shredded. ___________________________________________________________________________________________________________________________________ Introducing [facility]! Dear Constantino Chamberlain: Thank you for requesting a [employer] account. Our records indicate that you already have an active Might[employer] account. You can access your account anytime at [email or web address] Did you know that you can access your hospital and ER discharge instructions at any time in Might[employer]? You can also review all of your test results from your hospital stay or ER visit. Additional Information If you have questions, please visit the Frequently Asked Questions section of the Might[employer] website at [email or web address] Remember, Might[employer]est is NOT to be used for urgent needs. For medical emergencies, dial 911. Now available from your iPhone and Android! Providers Seen During Your Hospitalization Provider Specialty Primary office phone Noy Kim MD Plastic Surgery [phone] Your Primary Care Physician (PCP) Primary Care Physician Office Phone Office Fax [address] [phone] You are allergic to the following No active allergies Recent Documentation Height Weight BMI Smoking Status 1.803 m 104.4 kg 32.09 kg/m2 Former Smoker Emergency Contacts Name Discharge Info Relation Home Work Mobile Lizette Ramirez DISCHARGE CAREGIVER [3] Girlfriend [18]   [phone] Patient Belongings The following personal items are in your possession at time of discharge: 
  Dental Appliances: None  Visual Aid: Glasses, Sent home      Home Medications: None   Jewelry: None  Clothing: Pants, Sent home, Shirt, Undergarments, Footwear, Socks, Jacket/Coat (to [employer])    Other Valuables: Eyeglasses, Cell Phone (maya lizette; denies having brought a wallet) Please provide this summary of care documentation to your next provider. Signatures-by signing, you are acknowledging that this After Visit Summary has been reviewed with you and you have received a copy. Patient Signature:  ____________________________________________________________ Date:  ____________________________________________________________  
  
Marcelle Chaviralich Provider Signature:  ____________________________________________________________ Date:  ____________________________________________________________

## 2018-01-18 NOTE — ANESTHESIA PREPROCEDURE EVALUATION
Anesthetic History   No history of anesthetic complications            Review of Systems / Medical History  Patient summary reviewed, nursing notes reviewed and pertinent labs reviewed    Pulmonary            Asthma : well controlled       Neuro/Psych              Cardiovascular  Within defined limits                Exercise tolerance: >4 METS     GI/Hepatic/Renal     GERD: well controlled           Endo/Other        Obesity  Pertinent negatives: No diabetes, hypothyroidism, hyperthyroidism and morbid obesity   Other Findings            Physical Exam    Airway  Mallampati: II  TM Distance: 4 - 6 cm  Neck ROM: normal range of motion   Mouth opening: Normal    Comments: beard Cardiovascular    Rhythm: regular  Rate: normal         Dental  No notable dental hx       Pulmonary  Breath sounds clear to auscultation               Abdominal  GI exam deferred       Other Findings            Anesthetic Plan    ASA: 2  Anesthesia type: general          Induction: Intravenous  Anesthetic plan and risks discussed with: Patient and Family

## 2018-01-18 NOTE — PERIOP NOTES
AVS med list reviewed and verified-no duplicates noted. Discharge instructions given to include CLARISA drains record and drsgs, and opportunity for questions.

## 2018-01-18 NOTE — PERIOP NOTES
TRANSFER - IN REPORT:    Verbal report received from DONATO & Dr. Beltran Powers on [place] [facility]  being received from OR (unit) for routine post - op      Report consisted of patients Situation, Background, Assessment and   Recommendations(SBAR). Information from the following report(s) SBAR, Intake/Output and MAR was reviewed with the receiving nurse. Opportunity for questions and clarification was provided. Assessment completed upon patients arrival to unit and care assumed.

## 2018-01-18 NOTE — DISCHARGE INSTRUCTIONS
DISCHARGE SUMMARY from Nurse    PATIENT INSTRUCTIONS:    After general anesthesia or intravenous sedation, for 24 hours or while taking prescription Narcotics:  · Limit your activities  · Do not drive and operate hazardous machinery  · Do not make important personal or business decisions  · Do  not drink alcoholic beverages  · If you have not urinated within 8 hours after discharge, please contact your surgeon on call. Report the following to your surgeon:  · Excessive pain, swelling, redness or odor of or around the surgical area  · Temperature over 100.5  · Nausea and vomiting lasting longer than 4 hours or if unable to take medications  · Any signs of decreased circulation or nerve impairment to extremity: change in color, persistent  numbness, tingling, coldness or increase pain  · Any questions    What to do at Home:  Recommended activity: Activity as tolerated, Ambulate in house, No heavy lifting, pushing, pulling and No driving while on analgesics,     If you experience any of the following symptoms fever, chills, uncontrollable pain not relieved with pain meds/ or rest, persistent nausea and vomiting, foul drainage from incision, and increased swelling and redness notify dr Angeles Weiss    *  Please give a list of your current medications to your Primary Care Provider. *  Please update this list whenever your medications are discontinued, doses are      changed, or new medications (including over-the-counter products) are added. *  Please carry medication information at all times in case of emergency situations. These are general instructions for a healthy lifestyle:    No smoking/ No tobacco products/ Avoid exposure to second hand smoke  Surgeon General's Warning:  Quitting smoking now greatly reduces serious risk to your health.     Obesity, smoking, and sedentary lifestyle greatly increases your risk for illness    A healthy diet, regular physical exercise & weight monitoring are important for maintaining a healthy lifestyle    You may be retaining fluid if you have a history of heart failure or if you experience any of the following symptoms:  Weight gain of 3 pounds or more overnight or 5 pounds in a week, increased swelling in our hands or feet or shortness of breath while lying flat in bed. Please call your doctor as soon as you notice any of these symptoms; do not wait until your next office visit. Recognize signs and symptoms of STROKE:    F-face looks uneven    A-arms unable to move or move unevenly    S-speech slurred or non-existent    T-time-call 911 as soon as signs and symptoms begin-DO NOT go       Back to bed or wait to see if you get better-TIME IS BRAIN. Warning Signs of HEART ATTACK     Call 911 if you have these symptoms:   Chest discomfort. Most heart attacks involve discomfort in the center of the chest that lasts more than a few minutes, or that goes away and comes back. It can feel like uncomfortable pressure, squeezing, fullness, or pain.  Discomfort in other areas of the upper body. Symptoms can include pain or discomfort in one or both arms, the back, neck, jaw, or stomach.  Shortness of breath with or without chest discomfort.  Other signs may include breaking out in a cold sweat, nausea, or lightheadedness. Don't wait more than five minutes to call 911 - MINUTES MATTER! Fast action can save your life. Calling 911 is almost always the fastest way to get lifesaving treatment. Emergency Medical Services staff can begin treatment when they arrive -- up to an hour sooner than if someone gets to the hospital by car. The discharge information has been reviewed with the patient and caregiver. The patient and caregiver verbalized understanding.   Discharge medications reviewed with the patient and caregiver and appropriate educational materials and side effects teaching were provided    Patient armband removed and shredded.   ___________________________________________________________________________________________________________________________________

## 2018-01-18 NOTE — H&P
History and Physical    Patient: Trena Vanegas MRN: [id number]  SSN: [id number]    YOB: 1985  Age: 34 y.o. Sex: male      Subjective: Trena Vanegas is a 34 y.o. male who has had massive weight loss and now has a symptomatic abdominal panus. Past Medical History:   Diagnosis Date    Acid reflux     Asthma     childhood    Back pain     daily    Back pain     GERD (gastroesophageal reflux disease)     Intestinal malabsorption     Morbid obesity with body mass index of 60.0-69.9 in adult [facility])     H/O    S/P bariatric surgery     Smoking history     quit 2013    Strabismus      Past Surgical History:   Procedure Laterality Date    HX GI  03/15/2016    gastric sleeve    HX WISDOM TEETH EXTRACTION        History reviewed. No pertinent family history. Social History   Substance Use Topics    Smoking status: Former Smoker     Packs/day: 1.00     Years: 13.00     Quit date: 1/1/2013    Smokeless tobacco: Former User    Alcohol use No      Prior to Admission medications    Medication Sig Start Date End Date Taking? Authorizing Provider   ergocalciferol (ERGOCALCIFEROL) 50,000 unit capsule TAKE ONE CAPSULE BY MOUTH TWICE A WEEK 11/14/17  Yes VIDHI Patel   omeprazole (PRILOSEC) 40 mg capsule TAKE 1 CAP BY MOUTH DAILY. INDICATIONS: GASTROESOPHAGEAL REFLUX, HEARTBURN 8/12/17  Yes Kadie Kraft NP   cyanocobalamin (VITAMIN B12) 1,000 mcg/mL injection 1 mL by SubCUTAneous route every thirty (30) days. Indications: PREVENTION OF VITAMIN B12 DEFICIENCY 2/21/17  Yes Kadie Kraft NP   multivitamin (ONE A DAY) tablet Take 1 Tab by mouth daily. Yes Historical Provider   BIOTIN PO Take 5,000 mcg by mouth daily. Historical Provider   Syringe with Needle, Disp, 3 mL 25 x 5/8\" syrg 1 Units by Does Not Apply route every month. 5/22/17   VIDHI Cantu   Syringe with Needle, Disp, 3 mL 25 x 5/8\" syrg 1 mL by SubCUTAneous route every thirty (30) days.  2/21/17 Nonda Manchester, NP        No Known Allergies    Review of Systems:  A comprehensive review of systems was negative except for that written in the History of Present Illness. Objective:     Vitals:    01/18/18 0602   BP: 124/65   Pulse: (!) 50   Resp: 18   Temp: 98.1 °F (36.7 °C)   SpO2: 100%   Weight: 104.4 kg   Height: 1.803 m        Physical Exam:  General:  Alert, cooperative, no distress, appears stated age. Eyes:  Conjunctivae/corneas clear. PERRL, EOMs intact. Fundi benign   Ears:  Normal TMs and external ear canals both ears. Nose: Nares normal. Septum midline. Mucosa normal. No drainage or sinus tenderness. Mouth/Throat: Lips, mucosa, and tongue normal. Teeth and gums normal.   Neck: Supple, symmetrical, trachea midline, no adenopathy, thyroid: no enlargment/tenderness/nodules, no carotid bruit and no JVD. Back:   Symmetric, no curvature. ROM normal. No CVA tenderness. Lungs:   Clear to auscultation bilaterally. Heart:  Regular rate and rhythm, S1, S2 normal, no murmur, click, rub or gallop. Abdomen:   Soft, non-tender. Bowel sounds normal. No masses,  No organomegaly. Extremities: Extremities normal, atraumatic, no cyanosis or edema. Pulses: 2+ and symmetric all extremities. Skin: Skin color, texture, turgor normal. No rashes or lesions   Lymph nodes: Cervical, supraclavicular, and axillary nodes normal.   Neurologic: CNII-XII intact. Normal strength, sensation and reflexes throughout.        Assessment:     Hospital Problems  Date Reviewed: 1/18/2018    None          Plan:     panniculectomy    Signed By: Orville Corea MD     January 18, 2018

## 2018-01-18 NOTE — OP NOTES
[facility]  OPERATIVE REPORT    Nina Lamb  MR#: [id number]  : 1985  ACCOUNT #: [de-identified]   DATE OF SERVICE: 2018    PREOPERATIVE DIAGNOSIS:  Abdominal pannus. POSTOPERATIVE DIAGNOSIS:  Abdominal pannus. PROCEDURE:  Panniculectomy and plication of abdominal diastasis. SURGEON:  Jon Recio MD    ASSISTANT:  none     ANESTHESIA:  General.    ESTIMATED BLOOD LOSS:  100 mL. SPECIMENS:  Abdominal skin and fat discarded. COMPLICATIONS:  none     IMPLANTS:  none     DISPOSITION:  To recovery room in stable condition. INDICATIONS FOR PROCEDURE:  The patient is a 79-year-old male who underwent a gastric sleeve operation and lost 220 pounds. As a result of this, he has an excessive abdominal apron which is causing symptoms with skin breakdown. The patient failed conservative measures to control the problems associated with the abdominal pannus so he will be taken to the operating room for panniculectomy. DETAILS OF PROCEDURE:  The patient was identified and marked in the preop holding area. He had had SCD stockings placed bilaterally and was given IV antibiotics preoperatively. He was taken to the operating room, placed in the supine position and put under general anesthesia without difficulty. His abdomen was prepped and draped in usual sterile fashion. To begin the case, a low transverse incision was made in the pubic area and extended out just below the iliac crest bilaterally. The incision was deepened down through the subcutaneous tissue with electrocautery until the anterior layer of the abdominal wall was encountered. Any subcutaneous vessels encountered were clamped, ligated with 3-0 Vicryl ties and divided. The abdominal skin and fat was elevated up to the umbilicus. Any perforating vessels identified coming out of the fascia were clamped, ligated with 3-0 Vicryl ties and divided.   The umbilicus was circumferentially incised and left in a well vascularized stalk to the abdominal wall. The lower portion of the abdominal skin flap was then divided to provide better exposure. The abdominal skin and fat was then elevated up to the xiphoid in the midline and costal margins bilaterally. Again, any subcutaneous perforators encountered were clamped, ligated with 3-0 Vicryl ties and divided. The patient was relaxed and diastasis between the rectus muscles was imbricated with a running suture of 0 PDS from the xiphoid to the umbilicus and then again from the umbilicus to the pubis. Two 19 round Yefri drains were placed through inferior stab incisions in the pubic area and sewn into place with 2-0 nylon sutures. The bed was flexed at the waist.  The excess amount of lower abdominal skin and fat was estimated, incised and removed. The skin was closed with a dermal layer of 3-0 Monocryl and running subcuticular 3-0 Monocryl. The umbilicus was brought out onto the midline and inset with a deep layer of 3-0 Monocryl, running subcuticular of 4-0 Monocryl. Xeroform was placed in the umbilicus. Bacitracin, Adaptic, ABDs and a postoperative compression garment was placed over the abdomen. The patient tolerated the procedure well and was taken awake and alert to the recovery room in stable condition.       MD DANIA Camejo / [place]  D: 01/18/2018 09:44     T: 01/18/2018 10:21  JOB #: [id number]  CC: Clementine Donoplhelene [address] [facility]

## 2018-01-18 NOTE — ANESTHESIA POSTPROCEDURE EVALUATION
Post-Anesthesia Evaluation and Assessment    Cardiovascular Function/Vital Signs  Visit Vitals    /55 (BP 1 Location: Left arm, BP Patient Position: At rest)    Pulse (!) 54    Temp 36.5 °C (97.7 °F)    Resp 20    Ht 5' 11\" (1.803 m)    Wt 104.4 kg (230 lb 1 oz)    SpO2 96%    BMI 32.09 kg/m2       Patient is status post Procedure(s):  PANNICULECTOMY . Nausea/Vomiting: Controlled. Postoperative hydration reviewed and adequate. Pain:  Pain Scale 1: FLACC (01/18/18 1030)  Pain Intensity 1: 0 (01/18/18 1030)   Managed. Neurological Status:   Neuro (WDL): Within Defined Limits (01/18/18 1028)   At baseline. Mental Status and Level of Consciousness: Arousable. Pulmonary Status:   O2 Device: Room air (01/18/18 1027)   Adequate oxygenation and airway patent. Complications related to anesthesia: None    Post-anesthesia assessment completed. No concerns. Patient has met all discharge requirements.     Signed By: Cathy Robison CRNA    January 18, 2018

## 2018-01-18 NOTE — BRIEF OP NOTE
BRIEF OPERATIVE NOTE    Date of Procedure: 1/18/2018   Preoperative Diagnosis: ABDOMINAL PANNUS  Postoperative Diagnosis: ABDOMINAL PANNUS    Procedure(s):  PANNICULECTOMY   Surgeon(s) and Role:      Forrest Aguilar MD - Primary         Assistant Staff:       Surgical Staff:  Circ-1: Ed Jackson RN  Circ-Relief: Keri Nazario RN  Scrub Tech-1: Guerrero Inman  Surg Asst-1: Claudio Butler  Event Time In   Incision Start 6674   Incision Close 2033     Anesthesia: General   Estimated Blood Loss: 100cc  Specimens: * No specimens in log *   Findings: abdominal pannus   Complications: stable  Implants: * No implants in log *

## 2018-11-02 RX ORDER — OMEPRAZOLE 40 MG/1
40 CAPSULE, DELAYED RELEASE ORAL DAILY
Qty: 30 CAP | Refills: 3 | Status: SHIPPED | OUTPATIENT
Start: 2018-11-02 | End: 2019-06-19 | Stop reason: SDUPTHER

## 2019-02-18 ENCOUNTER — DOCUMENTATION ONLY (OUTPATIENT)
Dept: SURGERY | Age: 34
End: 2019-02-18

## 2019-02-18 NOTE — LETTER
[facility] [place] [facility] 
 
 
Dear Patient, Your health is our main concern. It is important for your health to have follow-up lab work and to see you surgeon at 2 months, 4 months, 6 months, 9 months and annually after your weight loss surgery. Additionally, the Department of Bariatric Surgery at our hospital is a member of the [employer] [address] Surgical Quality Improvement Program ([facility] NSQIP). As a participant in this program, we gather information on the outcomes of our patients after surgery. Please call the office for a follow up appointment at [phone]. If you have moved out of the area or have changed surgeons please call us and let us know the name of your doctor. Your health and feedback are important to us. We greatly appreciate your response. Thank you, [facility] [place] [facility]

## 2019-02-18 NOTE — PROGRESS NOTES
Per [facility] requirements;  E-mail and letter sent for follow up appointment. [facility] [place]  [facility]  [facility]      Dear Patient,    Your health is our main concern. It is important for your health to have follow-up lab work and to see you surgeon at 2 months, 4 months, 6 months, 9 months and annually after your weight loss surgery. Additionally, the Department of Bariatric Surgery at our hospital is a member of the [address] Surgical Quality Improvement Program ([facility] NSQIP). As a participant in this program, we gather information on the outcomes of our patients after surgery. Please call the office for a follow up appointment at [phone]. If you have moved out of the area or have changed surgeons please call us and let us know the name of your doctor. Your health and feedback are important to us. We greatly appreciate your response.        Thank you,  [facility]

## 2019-03-04 ENCOUNTER — HOSPITAL ENCOUNTER (OUTPATIENT)
Dept: PHYSICAL THERAPY | Age: 34
Discharge: HOME | End: 2019-03-04
Payer: MEDICAID

## 2019-03-04 PROCEDURE — 97530 THERAPEUTIC ACTIVITIES: CPT

## 2019-03-04 PROCEDURE — 97161 PT EVAL LOW COMPLEX 20 MIN: CPT

## 2019-03-04 PROCEDURE — 97110 THERAPEUTIC EXERCISES: CPT

## 2019-03-04 NOTE — PROGRESS NOTES
PT DAILY TREATMENT NOTE/LUMBAR EVAL 10-18 Patient Name: Jose John Date:3/4/2019 : 1985 [x]  Patient  Verified Payor: Julius Kevin / Plan: [address] [place] [address] [address] / Product Type: Managed Care Medicaid / In time:3:00  Out time:3:40 Total Treatment Time (min): 40 Visit #: 1 of 8 Treatment Area: Lumbar pain [M54.5] SUBJECTIVE Pain Level (0-10 scale): 2 [x]constant []intermittent [x]improving []worsening []no change since onset Any medication changes, allergies to medications, adverse drug reactions, diagnosis change, or new procedure performed?: [x] No    [] Yes (see summary sheet for update) Subjective functional status/changes:    
PLOF: sedentary lifestyle, working full time as maintaince Limitations to PLOF: pain with working, ADLs, sitira ad sgetting in/out car Mechanism of Injury: 7 years ago with insidious onset Current symptoms/Complaints: Pain increases to 10/10 with being on bedridden with kneeling for long time, standing > 30 mintues. Pain decreases with sitting, laying on left side to 2/10. Describes pain as achy, occasional n/t down right leg with last time 3 months. X rays last week with unremarkable results, MRI results with DDD and herniated disc in lower thoracic spine. Last injection 6-7 months with some relief. Annamary Ripa Previous Treatment/Compliance: Yes PMHx/Surgical Hx:gastric sleeve 3 years Work Hx: gastrric sleeve sx 3 years Living Situation: 1 story, 1 BRAD Pt Goals: \"alleviate the pain. \" OBJECTIVE/EXAMINATION 
 
22 min [x]Eval                  []Re-Eval  
 
 
8 min Therapeutic Exercise:  [x] See flow sheet :  
Rationale: increase ROM and increase strength to improve the patients ability to perform daily activities with decreased pain and symptom levels With 
 [] TE 
 [x] TA -10' [] neuro 
 [] other: Patient Education: [x] Review HEP [] Progressed/Changed HEP based on: [x] positioning   [x] body mechanics   [] transfers   [] heat/ice application   
[x] other: pt education on anatomy, exam findings, POC, HEP overview, mechanics with lifting at work Other Objective/Functional Measures: see initial eval  
 
Physical Therapy Evaluation - Lumbar Spine ([facility]) OBJECTIVEPosture: 
Lateral Shift: [] R    [] L     [] +  [x] - Kyphosis: [x] Increased [] Decreased   []  WNL Lordosis:  [x] Increased [] Decreased   [] WNL Pelvic symmetry: [x] WNL    [] Other: 
 
Gait:  [] Normal     [x] Abnormal: decreased trunk rotation, arm swing, trendelenberg bilaterally Active Movements: [] N/A   [] Too acute   [] Other: ROM % AROM % PROM Comments:pain, area Forward flexion 40-60 Anterior malleoli  Pain with coming up Extension 20-30 100% SB right 20-30 100% SB left 20-30 100% Rotation right 5-10 20in Rotation left 5-10 19.5in  pain Repeated Movements Effects on present pain: produces (IA), abolishes (A), increases (incr), decreases (decr), centralizes (C), peripheral (PH), no effect (NE) Pre-Test Sx Flexion Repeated Flexion Extension Repeated Extension Repeated SBL Repeated SBR Sitting Standing Lying      N/A N/A Comments: 
Side Glide: 
Sustained passive positioning test: 
 
Neuro Screen [x] WNL Myotome/Dermatome/Reflexes: 
Comments: 
 
Dural Mobility: SLR Sitting: [] R    [] L    [] +    [x] -  @ (degrees):  
        Supine: [] R    [] L    [] +    [x] -  @ (degrees):  
Slump Test: [] R    [] L    [] +    [x] -  @ (degrees): Prone Knee Bend: [] R    [] L    [] +    [] -  
 
Palpation [] Min  [x] Mod  [] Severe    Location: right thoracic paraspinals [] Min  [] Mod  [] Severe    Location: 
[] Min  [] Mod  [] Severe    Location: 
 
Strength 
 L(0-5) R (0-5) N/T Hip Flexion (L1,2) 5 5 [] Knee Extension (L3,4) 5 5 [] Ankle Dorsiflexion (L4) 5 5 [] Great Toe Extension (L5)   [] Ankle Plantarflexion (S1)   [] Knee Flexion (S1,2) 4 4 [] Upper Abdominals   [] Lower Abdominals   [] Paraspinals   [] Back Rotators   [] Gluteus Ildefonso 4 3+ [] Other   [] Special Tests Lumbar:  Lumb. Compression: [] Pos  [] Neg            
  Lumbar Distraction:   [] Pos  [] Neg 
  Quadrant:  [] Pos  [] Neg   [] Flex  [] Ext Sacroilliac:  Gaenslen's: [] R    [] L    [] +    [] -  
  Compression: [] +    [] -  
  Gapping:  [] +    [] - Thigh Thrust: [] R    [] L    [] +    [] - Leg Length: [] +    [] -   Position: 
  Crests: 
  ASIS: 
  PSIS: 
  Sacral Sulcus: 
  Mobility: Standing flex: 
   Sitting flex: 
   Supine to sit: 
   Prone knee bend: 
 
     Hip: Orlena Love:  [] R    [] L    [] +    [x] - Scour:  [] R    [] L    [] +    [x] - Piriformis: [] R    [] L    [] +    [x] - Deficits: Marv's: [] R    [] L    [] +    [] - [place]: [] R    [] L    [] +    [] - Hamstrings 90/90: 
  Gastrocsoleus (to neutral): Right: Left: 
 
Other tests/comments: 
Challenged with PPT Good SL balance Back pain with 1 bridge Pain Level (0-10 scale) post treatment: 2 
 
ASSESSMENT/Changes in Function: Pt is a 31yo male presenting to therapy with c/c right sided thoracic pain ongoing for 7 years with insidious onset. Pt had gastric sleeve surgery 2 years ago with reporting some pain relief with completing therapy as well at another clinic with doing \"stretches\" with no relief. Xrays unremarkable and MRI results of DDD and lower thoracic right sided disc herniation per pt report. Pt reporting pain increases with standing/walking > 30 minutes, bending, kneeling. Reporting occasional n/t down right LE however denying red flags at this time. Pt demonstrates decreased trunk rotation ROM, glut and Hs strength with low back pain with 1 bridge, negative slump and SLR, and TTP over right thoracic paraspinals.  Signs and symptoms consistent with mechanical dysfunction. Pt would benefit from skilled PT services including dry needling to allot pt to complete functional daily activities and work duties with less pain. Patient will continue to benefit from skilled PT services to modify and progress therapeutic interventions, address functional mobility deficits, address ROM deficits, address strength deficits, analyze and address soft tissue restrictions, analyze and cue movement patterns, analyze and modify body mechanics/ergonomics, assess and modify postural abnormalities and instruct in home and community integration to attain remaining goals. []  See Plan of Care 
[]  See progress note/recertification 
[]  See Discharge Summary Progress towards goals / Updated goals: 
Short Term Goals: STG- To be accomplished in 2 week(s): 1. Pt will be independent with HEP to encourage prophylaxis. Eval:HEP dispensed Current: NA Long Term Goals: LTG- To be accomplished in 8 week(s): 1. Pt will report >/= 75% improvement to be able to complete work duties with less pain. Eval:10/10 max pain, 2/10 constant pain in right thoracic spine Current: NA 
 
2. Pt trunk rotation will improve to </=15in to improve gait mechanics Eval:left 19.5in, right 20in Current: NA 
 
3. Pt will be able to complete 20 bridges without pain to demonstrate improved functional glut and HS strength Eval:challenged with PPT, low back pain with 1 bridge Current: NA 
 
4. Pt FOTO score will increase by >/=7 points to show improvement in subjective function. Eval:61 Current: will address at visit 5 PLAN 
[]  Upgrade activities as tolerated     [x]  Continue plan of care 
[]  Update interventions per flow sheet      
[]  Discharge due to:_ 
[]  Other:_ Lyndon Coe Remesic 3/4/2019  3:03 PM

## 2019-03-04 NOTE — PROGRESS NOTES
[facility] at the [address], [place], [address] [facility] Phone: [phone]      Fax:  [phone] Plan of Care/ Statement of Necessity for Physical Therapy Services Patient name: Ayush Start of Care: 3/4/2019 Referral source: Chay García MD : 1985 Medical Diagnosis: Lumbar pain [M54.5] Onset Date:7 years Treatment Diagnosis: mechanical back pain Prior Hospitalization: see medical history Provider#: [id number] Medications: Verified on Patient summary List  
 Comorbidities: gastric sleeve sx Prior Level of Function: sedentary lifestyle, full time maintenance at living [employer] The Plan of Care and following information is based on the information from the initial evaluation. Assessment/ key information: Pt is a 29yo male presenting to therapy with c/c right sided thoracic pain ongoing for 7 years with insidious onset. Pt had gastric sleeve surgery 2 years ago with reporting some pain relief with completing therapy as well at another clinic with doing \"stretches\" with no relief. Xrays unremarkable and MRI results of DDD and lower thoracic right sided disc herniation per pt report. Pt reporting pain increases with standing/walking > 30 minutes, bending, kneeling. Reporting occasional n/t down right LE however denying red flags at this time. Pt demonstrates decreased trunk rotation ROM, glut and Hs strength with low back pain with 1 bridge, negative slump and SLR, and TTP over right thoracic paraspinals. Signs and symptoms consistent with mechanical dysfunction. Pt would benefit from skilled PT services including dry needling to allot pt to complete functional daily activities and work duties with less pain.   
 
Evaluation Complexity History MEDIUM  Complexity : 1-2 comorbidities / personal factors will impact the outcome/ POC ; Examination LOW Complexity : 1-2 Standardized tests and measures addressing body structure, function, activity limitation and / or participation in recreation  ;Presentation LOW Complexity : Stable, uncomplicated  ;Clinical Decision Making MEDIUM Complexity : FOTO score of 26-74 Overall Complexity Rating: LOW Problem List: pain affecting function, decrease ROM, decrease strength, impaired gait/ balance, decrease ADL/ functional abilitiies, decrease activity tolerance, decrease flexibility/ joint mobility and decrease transfer abilities Treatment Plan may include any combination of the following: Therapeutic exercise, Therapeutic activities, Neuromuscular re-education, Physical agent/modality, Gait/balance training, Manual therapy, Patient education, Self Care training and Functional mobility training Patient / Family readiness to learn indicated by: asking questions, trying to perform skills and interest 
Persons(s) to be included in education: patient (P) Barriers to Learning/Limitations: None Patient Goal (s): alleviate the pain. \" 
Patient Self Reported Health Status: good Rehabilitation Potential: good Short Term Goals: STG- To be accomplished in 2 week(s): 1. Pt will be independent with HEP to encourage prophylaxis. Eval:HEP dispensed Current: NA 
  
Long Term Goals: LTG- To be accomplished in 8 week(s): 1. Pt will report >/= 75% improvement to be able to complete work duties with less pain. Eval:10/10 max pain, 2/10 constant pain in right thoracic spine Current: NA 
  
2.  Pt trunk rotation will improve to </=15in to improve gait mechanics Eval:left 19.5in, right 20in Current: NA 
  
3.  Pt will be able to complete 20 bridges without pain to demonstrate improved functional glut and HS strength Eval:challenged with PPT, low back pain with 1 bridge Current: NA 
  
4. Pt FOTO score will increase by >/=7 points to show improvement in subjective function. Eval:61 Current: will address at visit 5 
 
 
 Frequency / Duration: Patient to be seen 1 times per week for 8 weeks. Patient/ Caregiver education and instruction: Diagnosis, prognosis, self care, activity modification and exercises 
 [x]  Plan of care has been reviewed with NARESH Pham 3/4/2019 3:59 PM 
_____________________________________________________________________ I certify that the above Therapy Services are being furnished while the patient is under my care. I agree with the treatment plan and certify that this therapy is necessary. [de-identified] Signature:____________Date:_________TIME:________ 
 
[employer], Date and Time must be completed for valid certification ** Please sign and return to [facility] at the [place] [address] [address] [facility] [place] [place], [address] [facility] Phone: [phone]      Fax:  [phone]

## 2019-03-19 ENCOUNTER — APPOINTMENT (OUTPATIENT)
Dept: PHYSICAL THERAPY | Age: 34
End: 2019-03-19
Payer: MEDICAID

## 2019-03-26 ENCOUNTER — APPOINTMENT (OUTPATIENT)
Dept: PHYSICAL THERAPY | Age: 34
End: 2019-03-26
Payer: MEDICAID

## 2019-04-02 ENCOUNTER — APPOINTMENT (OUTPATIENT)
Dept: PHYSICAL THERAPY | Age: 34
End: 2019-04-02

## 2019-04-09 ENCOUNTER — APPOINTMENT (OUTPATIENT)
Dept: PHYSICAL THERAPY | Age: 34
End: 2019-04-09

## 2019-04-16 ENCOUNTER — APPOINTMENT (OUTPATIENT)
Dept: PHYSICAL THERAPY | Age: 34
End: 2019-04-16

## 2019-04-23 ENCOUNTER — APPOINTMENT (OUTPATIENT)
Dept: PHYSICAL THERAPY | Age: 34
End: 2019-04-23

## 2019-04-30 ENCOUNTER — APPOINTMENT (OUTPATIENT)
Dept: PHYSICAL THERAPY | Age: 34
End: 2019-04-30

## 2019-05-23 NOTE — PROGRESS NOTES
[facility] at the [address], [place], [address] [facility]  Phone: [phone]      Fax:  [phone]    Discharge Summary    Patient name: Ayush     Start of Care: 3/4/19  Referral source: Zuleyka Villanueva MD    : 1985  Medical/Treatment Diagnosis: Lumbar pain [M54.5]  Onset Date:7 years  Prior Hospitalization: see medical history   Provider#: [id number]  Comorbidities: gastric sleeve sx   Prior Level of Function:sedentary lifestyle, full time maintenance at living [employer]                            Medications: Verified on Patient Summary List    Visits from Start of Care: 1    Missed Visits: 0  Reporting Period : 3/4/19 to 3/4/19    Short Term Goals: STG- To be accomplished in 2 week(s):  1.  Pt will be independent with HEP to encourage prophylaxis. Eval:HEP dispensed   Current: NA     Long Term Goals: LTG- To be accomplished in 8 week(s):  1.  Pt will report >/= 75% improvement to be able to complete work duties with less pain. Eval:10/10 max pain, 2/10 constant pain in right thoracic spine  Current: NA     2.  Pt trunk rotation will improve to </=15in to improve gait mechanics   Eval:left 19.5in, right 20in  Current: NA     3.  Pt will be able to complete 20 bridges without pain to demonstrate improved functional glut and HS strength  Eval:challenged with PPT, low back pain with 1 bridge   Current: NA     4.  Pt FOTO score will increase by >/=7 points to show improvement in subjective function. Eval:61  Current: will address at visit 5       Assessment/ Summary of Care: Pt presented to therapy with c/c right sided thoracic pain ongoing for 7 years with insidious onset. Pt had gastric sleeve surgery 2 years ago with reporting some pain relief with completing therapy as well at another clinic with doing \"stretches\" with no relief.  Pt only attended initial eval with HEP dispensed with ready to be discharged at this time due to pt request with busy schedule.          RECOMMENDATIONS:  [x]Discontinue therapy: []Patient has reached or is progressing toward set goals      [x]Patient is non-compliant or has abdicated      []Due to lack of appreciable progress towards set goals    Camron Wayne [facility] 5/23/2019 1:49 PM

## 2019-06-06 ENCOUNTER — DOCUMENTATION ONLY (OUTPATIENT)
Dept: SURGERY | Age: 34
End: 2019-06-06

## 2019-06-06 NOTE — PROGRESS NOTES
Dee & Alison,  Please call this patient to schedule an appointment. He has not been since in over a year and no refills for Omeprazole can be written until seen.        Thank you

## 2019-06-06 NOTE — PROGRESS NOTES
Dr. Jayden Solorio please be advised:    Notification received from insurance company about possible over utilization of PPI . Patient not seen in over a year, will be contacted to schedule an appointment.

## 2019-06-19 ENCOUNTER — OFFICE VISIT (OUTPATIENT)
Dept: SURGERY | Age: 34
End: 2019-06-19

## 2019-06-19 VITALS
RESPIRATION RATE: 16 BRPM | HEIGHT: 71 IN | DIASTOLIC BLOOD PRESSURE: 73 MMHG | OXYGEN SATURATION: 100 % | WEIGHT: 292 LBS | SYSTOLIC BLOOD PRESSURE: 124 MMHG | HEART RATE: 67 BPM | TEMPERATURE: 97.6 F | BODY MASS INDEX: 40.88 KG/M2

## 2019-06-19 DIAGNOSIS — K21.9 GASTROESOPHAGEAL REFLUX DISEASE WITHOUT ESOPHAGITIS: ICD-10-CM

## 2019-06-19 DIAGNOSIS — K90.9 INTESTINAL MALABSORPTION, UNSPECIFIED TYPE: Primary | ICD-10-CM

## 2019-06-19 DIAGNOSIS — K90.9 INTESTINAL MALABSORPTION, UNSPECIFIED TYPE: ICD-10-CM

## 2019-06-19 DIAGNOSIS — Z98.84 S/P BARIATRIC SURGERY: ICD-10-CM

## 2019-06-19 RX ORDER — CYANOCOBALAMIN 1000 UG/ML
1000 INJECTION, SOLUTION INTRAMUSCULAR; SUBCUTANEOUS
Qty: 3 ML | Refills: 4
Start: 2019-06-19 | End: 2019-06-19 | Stop reason: SDUPTHER

## 2019-06-19 RX ORDER — CYANOCOBALAMIN 1000 UG/ML
1000 INJECTION, SOLUTION INTRAMUSCULAR; SUBCUTANEOUS
Qty: 3 ML | Refills: 4 | Status: SHIPPED | OUTPATIENT
Start: 2019-06-19

## 2019-06-19 RX ORDER — OMEPRAZOLE 40 MG/1
40 CAPSULE, DELAYED RELEASE ORAL DAILY
Qty: 30 CAP | Refills: 3 | Status: SHIPPED | OUTPATIENT
Start: 2019-06-19 | End: 2019-06-19 | Stop reason: SDUPTHER

## 2019-06-19 RX ORDER — OMEPRAZOLE 40 MG/1
40 CAPSULE, DELAYED RELEASE ORAL DAILY
Qty: 30 CAP | Refills: 3 | Status: SHIPPED | OUTPATIENT
Start: 2019-06-19

## 2019-06-19 NOTE — PROGRESS NOTES
Subjective: Natalie Boland  is a 28 y.o. male who presents for follow-up about 3.2 years following laparoscopic sleeve gastrectomy. He has lost a total of 134 pounds since surgery. Body mass index is 40.73 kg/m². . EBWL is (49%). The patient presents today to assess their progress toward their goal of weight loss and to address any issues that may be present. Today the patient and I have reviewed their diet and how appropriate their food choices are. The following issues have been identified : severe reflux symptoms over past year with regurgitation since abdominoplasty and some weight regain. Weight Loss Metrics 6/19/2019 1/18/2018 1/4/2018 9/27/2017 9/7/2017 7/25/2017 5/22/2017   Today's Wt 292 lb 230 lb 1 oz 230 lb 227 lb 14.4 oz 240 lb 245 lb 14.4 oz 247 lb   BMI 40.73 kg/m2 32.09 kg/m2 32.08 kg/m2 31.79 kg/m2 33.47 kg/m2 34.3 kg/m2 34.45 kg/m2     . Surgery related complication: none       He reports sever GERD and denies vomiting and abdominal pain. The patients diet choices have been reviewed today and are fairly good. Patients pain score:0    Weight Loss Metrics 6/19/2019 1/18/2018 1/4/2018 9/27/2017 9/7/2017 7/25/2017 5/22/2017   Today's Wt 292 lb 230 lb 1 oz 230 lb 227 lb 14.4 oz 240 lb 245 lb 14.4 oz 247 lb   BMI 40.73 kg/m2 32.09 kg/m2 32.08 kg/m2 31.79 kg/m2 33.47 kg/m2 34.3 kg/m2 34.45 kg/m2        The patient's exercise level: very active or not active. Changes in his medical history and medications have been reviewed.     Patient Active Problem List   Diagnosis Code    Morbid obesity with body mass index of 60.0-69.9 in adult ([facility] E66.01, Z68.44    Back pain M54.9    Smoking history Z87.891    Strabismus H50.9    GERD (gastroesophageal reflux disease) K21.9    Morbid obesity with BMI of 60.0-69.9, adult ([facility] E66.01, Z68.44    S/P bariatric surgery Z98.84    Intestinal malabsorption K90.9    Hypovitaminosis D E55.9     Past Medical History:   Diagnosis Date    Acid reflux     Asthma     childhood    Back pain     daily    Back pain     GERD (gastroesophageal reflux disease)     Intestinal malabsorption     Morbid obesity with body mass index of 60.0-69.9 in adult [facility])     H/O    S/P bariatric surgery     Smoking history     quit 2013    Strabismus      Past Surgical History:   Procedure Laterality Date    HX ABDOMINOPLASTY      2/2018    HX GI  03/15/2016    gastric sleeve    HX WISDOM TEETH EXTRACTION       Current Outpatient Medications   Medication Sig Dispense Refill    omeprazole (PRILOSEC) 40 mg capsule Take 1 Cap by mouth daily. 30 Cap 3    Syringe with Needle, Disp, 3 mL 25 x 5/8\" syrg 1 mL by SubCUTAneous route every thirty (30) days. 15 Syringe 0    cyanocobalamin (VITAMIN B12) 1,000 mcg/mL injection 1 mL by SubCUTAneous route every thirty (30) days. Indications: PREVENTION OF VITAMIN B12 DEFICIENCY 3 mL 4    BIOTIN PO Take 5,000 mcg by mouth daily.  multivitamin (ONE A DAY) tablet Take 1 Tab by mouth daily.           Review of Symptoms:       General - No history or complaints of unexpected fever or chills  Head/Neck - No history or complaints of headache or dizziness  Cardiac - No history or complaints of chest pain, palpitations, or shortness of breath  Pulmonary - No history or complaints of shortness of breath or productive cough  Gastrointestinal - as noted above  Genitourinary - No history or complaints of hematuria/dysuria or renal lithiasis  Musculoskeletal - No history or complaints of joint  muscular weakness  Hematologic - No history of any bleeding episodes  Neurologic - No history or complaints of  migraine headaches or neurologic symptoms                     Objective:     Visit Vitals  /73 (BP 1 Location: Left arm, BP Patient Position: Sitting)   Pulse 67   Temp 97.6 °F (36.4 °C)   Resp 16   Ht 5' 11\" (1.803 m)   Wt 132.5 kg (292 lb)   SpO2 100%   BMI 40.73 kg/m²        Physical Exam:    General:  alert, cooperative, no distress, appears stated age   Lungs:   clear to auscultation bilaterally   Heart:  Regular rate and rhythm   Abdomen:   abdomen is soft without significant tenderness, masses, organomegaly or guarding; very tight abdomen due to abdominooplasty   Incisions: healing well, no hernias noted       Lab Results   Component Value Date/Time    WBC 4.8 01/08/2018 08:10 AM    HGB 14.5 01/08/2018 08:10 AM    HCT 42.8 01/08/2018 08:10 AM    PLATELET 668 71/08/7735 08:10 AM    MCV 86.1 01/08/2018 08:10 AM     Lab Results   Component Value Date/Time    Sodium 145 01/08/2018 08:10 AM    Potassium 5.0 01/08/2018 08:10 AM    Chloride 107 01/08/2018 08:10 AM    CO2 30 01/08/2018 08:10 AM    Anion gap 8 01/08/2018 08:10 AM    Glucose 94 01/08/2018 08:10 AM    BUN 10 01/08/2018 08:10 AM    Creatinine 0.83 01/08/2018 08:10 AM    BUN/Creatinine ratio 12 01/08/2018 08:10 AM    GFR est AA >60 01/08/2018 08:10 AM    GFR est non-AA >60 01/08/2018 08:10 AM    Calcium 9.7 01/08/2018 08:10 AM    Bilirubin, total 0.3 03/07/2016 12:24 PM    AST (SGOT) 29 03/07/2016 12:24 PM    Alk. phosphatase 79 03/07/2016 12:24 PM    Protein, total 7.9 03/07/2016 12:24 PM    Albumin 3.8 03/07/2016 12:24 PM    Globulin 4.1 (H) 03/07/2016 12:24 PM    A-G Ratio 0.9 03/07/2016 12:24 PM    ALT (SGPT) 63 (H) 03/07/2016 12:24 PM     No results found for: IRON, FE, TIBC, IBCT, PSAT, FERR  Lab Results   Component Value Date/Time    Folate 15.58 01/08/2016 03:26 PM     Lab Results   Component Value Date/Time    VITAMIN D, 25-HYDROXY 20.1 (L) 01/08/2016 03:26 PM           Assessment:     1. History of Morbid obesity, status post  laparoscopic sleeve gastrectomy. He now has severe reflux symptoms related to likely his abdominoplasty and mild weight regain. Will assess his gastric pouch for any issues and will likely require conversion procedure to the gastric bypass. .     Plan:     1. UGI at [facility] to assess gastric pouch  2. Obtain routine labs  3.  See nutritionist

## 2019-06-19 NOTE — PATIENT INSTRUCTIONS
Body Mass Index: Care Instructions Your Care Instructions Body mass index (BMI) can help you see if your weight is raising your risk for health problems. It uses a formula to compare how much you weigh with how tall you are. · A BMI lower than 18.5 is considered underweight. · A BMI between 18.5 and 24.9 is considered healthy. · A BMI between 25 and 29.9 is considered overweight. A BMI of 30 or higher is considered obese. If your BMI is in the normal range, it means that you have a lower risk for weight-related health problems. If your BMI is in the overweight or obese range, you may be at increased risk for weight-related health problems, such as high blood pressure, heart disease, stroke, arthritis or joint pain, and diabetes. If your BMI is in the underweight range, you may be at increased risk for health problems such as fatigue, lower protection (immunity) against illness, muscle loss, bone loss, hair loss, and hormone problems. BMI is just one measure of your risk for weight-related health problems. You may be at higher risk for health problems if you are not active, you eat an unhealthy diet, or you drink too much alcohol or use tobacco products. Follow-up care is a key part of your treatment and safety. Be sure to make and go to all appointments, and call your doctor if you are having problems. It's also a good idea to know your test results and keep a list of the medicines you take. How can you care for yourself at home? · Practice healthy eating habits. This includes eating plenty of fruits, vegetables, whole grains, lean protein, and low-fat dairy. · If your doctor recommends it, get more exercise. Walking is a good choice. Bit by bit, increase the amount you walk every day. Try for at least 30 minutes on most days of the week. · Do not smoke. Smoking can increase your risk for health problems.  If you need help quitting, talk to your doctor about stop-smoking programs and medicines. These can increase your chances of quitting for good. · Limit alcohol to 2 drinks a day for men and 1 drink a day for women. Too much alcohol can cause health problems. If you have a BMI higher than 25 · Your doctor may do other tests to check your risk for weight-related health problems. This may include measuring the distance around your waist. A waist measurement of more than 40 inches in men or 35 inches in women can increase the risk of weight-related health problems. · Talk with your doctor about steps you can take to stay healthy or improve your health. You may need to make lifestyle changes to lose weight and stay healthy, such as changing your diet and getting regular exercise. If you have a BMI lower than 18.5 · Your doctor may do other tests to check your risk for health problems. · Talk with your doctor about steps you can take to stay healthy or improve your health. You may need to make lifestyle changes to gain or maintain weight and stay healthy, such as getting more healthy foods in your diet and doing exercises to build muscle. Where can you learn more? Go to http://niko-jasmin.info/. Enter S176 in the search box to learn more about \"Body Mass Index: Care Instructions. \" Current as of: June 25, 2018 Content Version: 11.9 © 8165-4838 [employer]. Care instructions adapted under license by [employer] (which disclaims liability or warranty for this information). If you have questions about a medical condition or this instruction, always ask your healthcare professional. [place] [address] any warranty or liability for your use of this information. Patient Instructions 1. Continue to monitor carbohydrate and protein intake- remember to keep your           total  carbohydrates to 50 grams or less per day for best results. 2. Remember hydration goals - usually 48 to 64 ounces of liquids per day 3. Continue to work towards exercise goals - minimum 3 days per week of 45          minutes to  1 hour at a time. 4. Remember to take vitamins as directed Supplement Resource Guide Importance of Protein:  
Maintains lean body mass, produces antibodies to fight off infections, heals wounds, minimizes hair loss, helps to give you energy, helps with satiety, and keeping you full between meals. Importance of Calcium: 
Needed for healthy bones and teeth, normal blood clotting, and nervous system functioning, higher risk of osteoporosis and bone disease with non-compliance. Importance of Multivitamins: Many functions. Supply you with extra nutrients that you may be missing from food. May lead to iron deficiency anemia, weakness, fatigue, and many other symptoms with non-compliance. Importance of B Vitamins: 
Important for red blood cell formation, metabolism, energy, and helps to maintain a healthy nervous system. Protein Supplement Find one you like now. Use immediately after surgery. Look for: 
35-50g protein each day from your protein supplement once you reach the progression diet. 0-3 g fat per serving 0-3 g sugar per serving Protein drinks should be split in separate dosages. Recommend: Lifelong 1 year +Calcium Supplement:  
 
Start taking within a month after surgery. Look for: Calcium Citrate Plus D (1500 mg per day) Recommend: Citracal 
 
 . Avoid chocolate chewable calcium. Can use chewable bariatric or [employer] brand or similar chewable. The body cannot absorb more than 500-600 mg @ a time. Take for Life Multi-vitamin Supplement:   
1st Month After Surgery: Any complete chewable, such as: [place]s Complete chewables. Avoid [place] sours or gummies. They lack iron and other important nutrients and also have added sugar. Continue with chewable vitamin or change to adult complete multivitamin one month after surgery. Menstruating women can take a prenatal vitamin. Make sure has at least 18 mg iron and [phone] mcg folic acid): Vitamin B12, B Complex Vitamin, and Biotin Start taking within a month after surgery. Vitamin B12:  1000 mcg of Vitamin B12 three times weekly Must take sublingually (meaning you take it under your tongue) or in a liquid drop form for easy absorption. B Complex Vitamin: Take a pill or liquid drop form once daily. Biotin: This vitamin can help prevent hair loss. Recommend 5mg  
(5000 mcg) a day Biotin is Optional

## 2019-07-03 ENCOUNTER — APPOINTMENT (OUTPATIENT)
Dept: GENERAL RADIOLOGY | Age: 34
End: 2019-07-03
Attending: SPECIALIST
Payer: MEDICAID

## 2019-07-03 ENCOUNTER — HOSPITAL ENCOUNTER (OUTPATIENT)
Age: 34
Setting detail: OUTPATIENT SURGERY
Discharge: HOME | End: 2019-07-03
Attending: SPECIALIST | Admitting: SPECIALIST
Payer: MEDICAID

## 2019-07-03 ENCOUNTER — HOSPITAL ENCOUNTER (OUTPATIENT)
Dept: LAB | Age: 34
Discharge: HOME | End: 2019-07-03

## 2019-07-03 VITALS
RESPIRATION RATE: 15 BRPM | DIASTOLIC BLOOD PRESSURE: 89 MMHG | WEIGHT: 284.25 LBS | SYSTOLIC BLOOD PRESSURE: 135 MMHG | HEART RATE: 58 BPM | OXYGEN SATURATION: 99 % | HEIGHT: 71 IN | TEMPERATURE: 96.8 F | BODY MASS INDEX: 39.79 KG/M2

## 2019-07-03 DIAGNOSIS — E66.01 MORBID OBESITY (HCC): ICD-10-CM

## 2019-07-03 LAB — SENTARA SPECIMEN COL,SENBCF: NORMAL

## 2019-07-03 PROCEDURE — 77030032490 HC SLV COMPR SCD KNE COVD -B: Performed by: SPECIALIST

## 2019-07-03 PROCEDURE — 99001 SPECIMEN HANDLING PT-LAB: CPT

## 2019-07-03 PROCEDURE — 76040000019: Performed by: SPECIALIST

## 2019-07-03 PROCEDURE — 74240 X-RAY XM UPR GI TRC 1CNTRST: CPT

## 2019-07-03 PROCEDURE — 74011000255 HC RX REV CODE- 255: Performed by: SPECIALIST

## 2019-07-03 NOTE — PROCEDURES
3.2 years post sleeve resection with recent severe GERD following an abd'plasty. Small hiatal hernia on UGI. Plan for conversion to bypass given level of GERD and UGI findings.   See dictation

## 2019-07-04 LAB
25(OH)D3 SERPL-MCNC: 23.2 NG/ML (ref 32–100)
A-G RATIO,AGRAT: 1.7 RATIO (ref 1.1–2.6)
ABSOLUTE LYMPHOCYTE COUNT, 10803: 1.9 K/UL (ref 1–4.8)
ALBUMIN SERPL-MCNC: 4.8 G/DL (ref 3.5–5)
ALP SERPL-CCNC: 74 U/L (ref 25–115)
ALT SERPL-CCNC: 12 U/L (ref 5–40)
ANION GAP SERPL CALC-SCNC: 16 MMOL/L
AST SERPL W P-5'-P-CCNC: 16 U/L (ref 10–37)
BASOPHILS # BLD: 0 K/UL (ref 0–0.2)
BASOPHILS NFR BLD: 1 % (ref 0–2)
BILIRUB SERPL-MCNC: 0.4 MG/DL (ref 0.2–1.2)
BUN SERPL-MCNC: 17 MG/DL (ref 6–22)
CALCIUM SERPL-MCNC: 10.2 MG/DL (ref 8.4–10.4)
CHLORIDE SERPL-SCNC: 101 MMOL/L (ref 98–110)
CO2 SERPL-SCNC: 25 MMOL/L (ref 20–32)
CREAT SERPL-MCNC: 0.8 MG/DL (ref 0.5–1.2)
EOSINOPHIL # BLD: 0.2 K/UL (ref 0–0.5)
EOSINOPHIL NFR BLD: 2 % (ref 0–6)
ERYTHROCYTE [DISTWIDTH] IN BLOOD BY AUTOMATED COUNT: 12.9 % (ref 10–15.5)
FERRITIN SERPL-MCNC: 25 NG/ML (ref 22–322)
FOLATE,FOL: 10.62 NG/ML
GFRAA, 66117: >60
GFRNA, 66118: >60
GLOBULIN,GLOB: 2.9 G/DL (ref 2–4)
GLUCOSE SERPL-MCNC: 98 MG/DL (ref 70–99)
GRANULOCYTES,GRANS: 65 % (ref 40–75)
HCT VFR BLD AUTO: 45 % (ref 36.6–51.9)
HGB BLD-MCNC: 15 G/DL (ref 13.2–17.3)
IRON,IRN: 95 MCG/DL (ref 45–160)
LYMPHOCYTES, LYMLT: 26 % (ref 20–45)
MCH RBC QN AUTO: 29 PG (ref 26–34)
MCHC RBC AUTO-ENTMCNC: 33 G/DL (ref 31–36)
MCV RBC AUTO: 86 FL (ref 80–95)
MONOCYTES # BLD: 0.5 K/UL (ref 0.1–1)
MONOCYTES NFR BLD: 7 % (ref 3–12)
NEUTROPHILS # BLD AUTO: 4.7 K/UL (ref 1.8–7.7)
PLATELET # BLD AUTO: 312 K/UL (ref 140–440)
PMV BLD AUTO: 10.9 FL (ref 9–13)
POTASSIUM SERPL-SCNC: 5.5 MMOL/L (ref 3.5–5.5)
PROT SERPL-MCNC: 7.7 G/DL (ref 6.4–8.3)
RBC # BLD AUTO: 5.26 M/UL (ref 3.8–5.8)
SODIUM SERPL-SCNC: 142 MMOL/L (ref 133–145)
VIT B12 SERPL-MCNC: 905 PG/ML (ref 211–911)
WBC # BLD AUTO: 7.3 K/UL (ref 4–11)

## 2019-07-04 NOTE — OP NOTES
[facility] [place]  OPERATIVE REPORT    Name:  Nazario Silveira  MR#:   [id number]  :  1985  ACCOUNT #:  [de-identified]  DATE OF SERVICE:  2019    PREOPERATIVE DIAGNOSIS:  The patient is 3 years out from sleeve gastrectomy with severe intractable reflux disease and regurgitation. POSTOPERATIVE DIAGNOSIS:  The patient is 3 years out from sleeve gastrectomy with severe intractable reflux disease and regurgitation. PROCEDURE PERFORMED:  Upper GI study with barium. SURGEON:  Judd Girard MD    ASSISTANT:  None. ANESTHESIA:  None. COMPLICATIONS:  None. SPECIMENS REMOVED:  None. IMPLANTS:  None. ESTIMATED BLOOD LOSS:  None. STATEMENT OF MEDICAL NECESSITY:  The patient is a 68-year-old gentleman who had a sleeve gastrectomy about 3 years ago. He has lost significant amount of weight and had been doing well until he had an abdominoplasty performed just over a year ago. Almost immediately after the abdominoplasty, he began to experience severe reflux symptoms to the point where he regurgitates at night. He finally presented to me recently with that history and is here today for upper GI study to assess his gastric sleeve. PROCEDURE:  The patient was brought to the fluoroscopy unit where he was given thin barium. On swallowing barium, he was noted to have normal peristalsis of his esophagus. He had prompt filling of the distal esophagus with tapering into and through the gastroesophageal junction. Contrast filled a small-sized hiatal hernia. There were several episodes of reflux noted. Contrast then filled the proximal, mid and distal sleeve, all of which was normal in caliber, dimension, and nondilated. Contrast ultimately flowed into the duodenum in a normal fashion. At this juncture, certainly given his situation, he is a candidate for conversion procedure to the gastric bypass. He had a history of super morbid obesity.   He was able to decrease BMI down to just at the 40 tanya and I have talked with him about the procedure and he has done his own research and that is the way he would like to proceed. We will submit his insurance copy for treatment.       Josseline Rosario MD      [email or web address]  D:  07/03/2019 16:13  T:  07/03/2019 22:33  JOB #:  [id number]

## 2019-07-05 ENCOUNTER — TELEPHONE (OUTPATIENT)
Dept: SURGERY | Age: 34
End: 2019-07-05

## 2019-07-05 RX ORDER — ERGOCALCIFEROL 1.25 MG/1
50000 CAPSULE ORAL 2 TIMES WEEKLY
Qty: 52 CAP | Refills: 1 | Status: SHIPPED | OUTPATIENT
Start: 2019-07-05 | End: 2019-08-23

## 2019-07-09 LAB — VITAMIN B1, WHOLE BLOOD, 66250: 117.4 NMOL/L (ref 66.5–200)

## 2019-07-16 ENCOUNTER — CLINICAL SUPPORT (OUTPATIENT)
Dept: SURGERY | Age: 34
End: 2019-07-16

## 2019-07-16 VITALS — WEIGHT: 284 LBS | BODY MASS INDEX: 39.76 KG/M2 | HEIGHT: 71 IN

## 2019-07-16 DIAGNOSIS — Z98.84 S/P BARIATRIC SURGERY: Primary | ICD-10-CM

## 2019-07-16 NOTE — PROGRESS NOTES
Medical Weight Loss Multi-Disciplinary Program    Name: Dalia Donahue   : 1985    Session# 1  Date: 2019     Height: 5' 11\" (180.3 cm)    Weight: 128.8 kg (284 lb) lbs. Body mass index is 39.61 kg/m². Pounds Lost: 8     Dietary Instructions    Reviewed intake  Understanding label reading  Understanding low carbohydrates, low sugar, higher protein meals  Understanding proper portions  Dining outside home  Instruction given for personal dietary changes  Discussed perceived compliance  Comments: Pt given brief pre/post-op diet ed and diet hx reviewed. 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 male who present for a pre-op evaluation. Visit Vitals  Ht 5' 11\" (1.803 m)   Wt 128.8 kg (284 lb)   BMI 39.61 kg/m²     Past Medical History:   Diagnosis Date    Acid reflux     Asthma     childhood    Back pain     daily    Back pain     GERD (gastroesophageal reflux disease)     Intestinal malabsorption     Morbid obesity with body mass index of 60.0-69.9 in adult [facility])     H/O    S/P bariatric surgery     Smoking history     quit     Strabismus            Procedure:  sleeve gastrectomy to a gastric bypass _REVISION d/t GERD     Reasons for Surgery:  BMI > 40 with one or more medically significant comorbidities    Summary:  Pt given brief pre/post-op diet ed and diet hx reviewed. Pt instructed to follow a low calorie, low carbohydrate, high protein diet of about 3957-0234 calories daily. Pt set several goals. See below.      Current Vitamins: MVI 2/d, B12 , prescription vitamin D     What have you done in the past to try to lose weight? Pt is here today due to a medical necessity of GERD     Why didn't you lose weight or keep the weight off?: pt had his original surgery done 2+ years ago and has been experiencing severe GERD. Why do you think having weight loss surgery will make it possible for you to lose weight and keep it off? Pt is here for a potential revision due to severe GERD. Patient Education and Materials Provided:  Supplement [facility], B Vitamin Information, MVI Recommendations, Calcium Citrate Information, Bariatric Supplement Companies, Protein Supplement Information, Fluid Requirements, No Caffeine or Carbonation, No Alcohol for One Year Post Op, 3 Balanced Meals a Day, Food Group Guide, Good Choices Dining Out, No Snacks, No Concentrated Sweets, Support System at Home and Exercising    Nutritional Hx: What is the number of meals you eat per day? 3  Comment: uses protein shake as a meal replacement     Do you eat between meals / snack? yes  Typical snack: snacks at night - yogurt or string cheese     How fast do you eat your meals? Slowly     How often do you eat fast food? none     How many sodas/sugared beverages do you drink per day? Diet pepsi     How many caffeinated drinks do you have per day? Coffee in the morning     How much milk and/or juice do you drink per day? Milk - 1% - twice a day - also uses milk with his protein shake     How much water do you drink per day? At work only 1 bottle of water, if he's not at work - probably none  ([address] 52)    How often do you consume alcohol? never;     Diet History:  Breakfast  What are you eating and how much? 2 eggs with cheese            Snacks  What are you eating and how much? Yogurt or string cheese            Hydration  What are you eating and how much? Coffee            Lunch  What are you eating and how much? Protein shake            Snacks  What are you eating and how much?  Yogurt and string cheese            Hydration  What are you eating and how much? Sometimes 1 bottle of water (at work)            Dinner  What are you eating and how much? Protein and a non-starchy vegetable            Snacks  What are you eating and how much? Yogurt and string cheese            Hydration  What are you eating and how much? Nothing              Exercise:  Do you currently have an exercise routine? patient has a high demand job    Goals:   1. Work to increase your non-caloric fluid to 64 ounces a day - it can be anything that's sugar free, diet and decaf     2. Continue to eat three meals a day focusing on protein and non-starchy vegetables    3.  Continue to use your protein shake as a meal replacement (for lunch)

## 2019-08-01 DIAGNOSIS — K21.9 GASTROESOPHAGEAL REFLUX DISEASE WITHOUT ESOPHAGITIS: Primary | ICD-10-CM

## 2019-08-01 DIAGNOSIS — R10.13 ABDOMINAL PAIN, EPIGASTRIC: ICD-10-CM

## 2019-08-01 DIAGNOSIS — Z98.84 S/P BARIATRIC SURGERY: ICD-10-CM

## 2019-08-01 DIAGNOSIS — Z01.812 BLOOD TESTS PRIOR TO TREATMENT OR PROCEDURE: ICD-10-CM

## 2019-08-01 DIAGNOSIS — R11.2 NAUSEA AND VOMITING, INTRACTABILITY OF VOMITING NOT SPECIFIED, UNSPECIFIED VOMITING TYPE: ICD-10-CM

## 2019-08-01 DIAGNOSIS — E66.01 MORBID OBESITY (HCC): ICD-10-CM

## 2019-08-05 ENCOUNTER — OFFICE VISIT (OUTPATIENT)
Dept: SURGERY | Age: 34
End: 2019-08-05

## 2019-08-05 ENCOUNTER — HOSPITAL ENCOUNTER (OUTPATIENT)
Dept: LAB | Age: 34
Discharge: HOME | End: 2019-08-05

## 2019-08-05 ENCOUNTER — HOSPITAL ENCOUNTER (OUTPATIENT)
Dept: PREADMISSION TESTING | Age: 34
Discharge: HOME | End: 2019-08-05
Payer: MEDICAID

## 2019-08-05 VITALS
RESPIRATION RATE: 16 BRPM | HEART RATE: 67 BPM | OXYGEN SATURATION: 100 % | WEIGHT: 285.8 LBS | SYSTOLIC BLOOD PRESSURE: 117 MMHG | TEMPERATURE: 97.6 F | HEIGHT: 71 IN | DIASTOLIC BLOOD PRESSURE: 74 MMHG | BODY MASS INDEX: 40.01 KG/M2

## 2019-08-05 DIAGNOSIS — G89.18 POST-OP PAIN: Primary | ICD-10-CM

## 2019-08-05 LAB
ABO + RH BLD: NORMAL
ATRIAL RATE: 60 BPM
BLOOD GROUP ANTIBODIES SERPL: NORMAL
CALCULATED P AXIS, ECG09: 31 DEGREES
CALCULATED R AXIS, ECG10: -4 DEGREES
CALCULATED T AXIS, ECG11: 43 DEGREES
DIAGNOSIS, 93000: NORMAL
P-R INTERVAL, ECG05: 160 MS
Q-T INTERVAL, ECG07: 392 MS
QRS DURATION, ECG06: 98 MS
QTC CALCULATION (BEZET), ECG08: 392 MS
SENTARA SPECIMEN COL,SENBCF: NORMAL
SPECIMEN EXP DATE BLD: NORMAL
VENTRICULAR RATE, ECG03: 60 BPM

## 2019-08-05 PROCEDURE — 99001 SPECIMEN HANDLING PT-LAB: CPT

## 2019-08-05 PROCEDURE — 86900 BLOOD TYPING SEROLOGIC ABO: CPT

## 2019-08-05 PROCEDURE — 93005 ELECTROCARDIOGRAM TRACING: CPT

## 2019-08-05 RX ORDER — OXYCODONE AND ACETAMINOPHEN 5; 325 MG/1; MG/1
1 TABLET ORAL
Qty: 30 TAB | Refills: 0 | Status: SHIPPED | OUTPATIENT
Start: 2019-08-05 | End: 2019-08-08

## 2019-08-05 RX ORDER — ENOXAPARIN SODIUM 100 MG/ML
40 INJECTION SUBCUTANEOUS EVERY 12 HOURS
Qty: 14 SYRINGE | Refills: 0 | Status: SHIPPED | OUTPATIENT
Start: 2019-08-05 | End: 2019-09-03

## 2019-08-05 NOTE — PATIENT INSTRUCTIONS
Body Mass Index: Care Instructions Your Care Instructions Body mass index (BMI) can help you see if your weight is raising your risk for health problems. It uses a formula to compare how much you weigh with how tall you are. · A BMI lower than 18.5 is considered underweight. · A BMI between 18.5 and 24.9 is considered healthy. · A BMI between 25 and 29.9 is considered overweight. A BMI of 30 or higher is considered obese. If your BMI is in the normal range, it means that you have a lower risk for weight-related health problems. If your BMI is in the overweight or obese range, you may be at increased risk for weight-related health problems, such as high blood pressure, heart disease, stroke, arthritis or joint pain, and diabetes. If your BMI is in the underweight range, you may be at increased risk for health problems such as fatigue, lower protection (immunity) against illness, muscle loss, bone loss, hair loss, and hormone problems. BMI is just one measure of your risk for weight-related health problems. You may be at higher risk for health problems if you are not active, you eat an unhealthy diet, or you drink too much alcohol or use tobacco products. Follow-up care is a key part of your treatment and safety. Be sure to make and go to all appointments, and call your doctor if you are having problems. It's also a good idea to know your test results and keep a list of the medicines you take. How can you care for yourself at home? · Practice healthy eating habits. This includes eating plenty of fruits, vegetables, whole grains, lean protein, and low-fat dairy. · If your doctor recommends it, get more exercise. Walking is a good choice. Bit by bit, increase the amount you walk every day. Try for at least 30 minutes on most days of the week. · Do not smoke. Smoking can increase your risk for health problems.  If you need help quitting, talk to your doctor about stop-smoking programs and medicines. These can increase your chances of quitting for good. · Limit alcohol to 2 drinks a day for men and 1 drink a day for women. Too much alcohol can cause health problems. If you have a BMI higher than 25 · Your doctor may do other tests to check your risk for weight-related health problems. This may include measuring the distance around your waist. A waist measurement of more than 40 inches in men or 35 inches in women can increase the risk of weight-related health problems. · Talk with your doctor about steps you can take to stay healthy or improve your health. You may need to make lifestyle changes to lose weight and stay healthy, such as changing your diet and getting regular exercise. If you have a BMI lower than 18.5 · Your doctor may do other tests to check your risk for health problems. · Talk with your doctor about steps you can take to stay healthy or improve your health. You may need to make lifestyle changes to gain or maintain weight and stay healthy, such as getting more healthy foods in your diet and doing exercises to build muscle. Where can you learn more? Go to http://niko-jasmin.info/. Enter S176 in the search box to learn more about \"Body Mass Index: Care Instructions. \" Current as of: March 28, 2019 Content Version: 12.1 © 3301-9734 [employer]. Care instructions adapted under license by [employer] (which disclaims liability or warranty for this information). If you have questions about a medical condition or this instruction, always ask your healthcare professional. [address] any warranty or liability for your use of this information.

## 2019-08-05 NOTE — PROGRESS NOTES
Gastric Bypass - History and Physical    Subjective: The patient is a 28 y.o. obese male with a Body mass index is 39.86 kg/m². Tiff Nicole he presents now to review their work up to date to see if they are a candidate for surgery and whether or not to proceed with the previously requested procedure. He had a sleeve resection done via this office 3.25 years ago. He now has intractable GERD and presents now for conversion procedure to the gastric bypass. Interestingly this all occurred after his abdominoplasty. Bariatric comorbidities continue to include:   Patient Active Problem List   Diagnosis Code    Morbid obesity with body mass index of 60.0-69.9 in adult ([facility] [address] E66.01, Z68.44    Back pain M54.9    Smoking history Z87.891    Strabismus H50.9    GERD (gastroesophageal reflux disease) K21.9    Morbid obesity with BMI of 60.0-69.9, adult ([facility] E66.01, Z68.44    S/P bariatric surgery Z98.84    Intestinal malabsorption K90.9    Hypovitaminosis D E55.9       They have been generally well prior to this visit and have had no recent significant illnesses. The patient has had no gastrointestinal issues that would preclude them from proceeding with the surgery they have chosen. [facility] has recently tried a preoperative weight loss program  in addition to seeing a bariatric nutritionist preoperatively. We have discussed on at least one other occasion about the various types of surgical weight loss procedures and they have considered these options after our initial consultation. We have once again discussed these procedures in detail and they have now decided on a surgical procedure. They present today to discuss this and confirm that their evaluation pre operatively is acceptable to continue with surgery. The patient desires laparoscopic gastric bypass surgery for surgical weight loss and GERD control. The patients goal weight is 200lb.  (this represents a BMI of 27)    These goals are consistent with expected outcomes of their desired operation. his Medical goals are resolution of these health issues. Patient Active Problem List    Diagnosis Date Noted    Hypovitaminosis D 2017    S/P bariatric surgery     Intestinal malabsorption     Morbid obesity with BMI of 60.0-69.9, adult ([facility] [address] 03/15/2016    Morbid obesity with body mass index of 60.0-69.9 in adult ([facility]     Back pain     Smoking history     Strabismus     GERD (gastroesophageal reflux disease)       Past Surgical History:   Procedure Laterality Date    HX ABDOMINOPLASTY      2018    HX GI  03/15/2016    gastric sleeve    HX WISDOM TEETH EXTRACTION        Social History     Tobacco Use    Smoking status: Former Smoker     Packs/day: 1.00     Years: 13.00     Pack years: 13.00     Last attempt to quit: 2013     Years since quittin.5    Smokeless tobacco: Former User   Substance Use Topics    Alcohol use: No     Alcohol/week: 0.0 standard drinks      No family history on file. Current Outpatient Medications   Medication Sig Dispense Refill    ergocalciferol (ERGOCALCIFEROL) 50,000 unit capsule Take 1 Cap by mouth two (2) times a week. Indications: Vitamin D Deficiency (High Dose Therapy) 52 Cap 1    cyanocobalamin (VITAMIN B12) 1,000 mcg/mL injection 1 mL by SubCUTAneous route every thirty (30) days. Indications: Prevention of Vitamin B12 Deficiency 3 mL 4    Syringe with Needle, Disp, 3 mL 25 x 5/8\" syrg 1 mL by SubCUTAneous route every thirty (30) days. 15 Syringe 0    omeprazole (PRILOSEC) 40 mg capsule Take 1 Cap by mouth daily. Indications: gastroesophageal reflux disease 30 Cap 3    multivitamin (ONE A DAY) tablet Take 1 Tab by mouth daily.  BIOTIN PO Take 5,000 mcg by mouth daily.        No Known Allergies     Review of Systems:        General - No history or complaints of unexpected fever, chills, or weight loss  Head/Neck - No history or complaints of headache, diplopia, dysphagia, hearing loss  Cardiac - No history or complaints of chest pain, palpitations, murmur, or shortness of breath  Pulmonary - No history or complaints of shortness of breath, productive cough, hemoptysis  Gastrointestinal - (+) history of reflux, No history or complaints of abdominal pain, obstipation/constipation, blood per rectum  Genitourinary - No history or complaints of hematuria/dysuria, stress urinary incontinence symptoms, or renal lithiasis  Musculoskeletal - No history or complaints of joint pain or muscular weakness  Hematologic - No history or complaints of bleeding disorders, blood transfusions, sickle cell anemia  Neurologic - No history or complaints of  migraine headaches, seizure activity, syncopal episodes, TIA or stroke  Integumentary - No history or complaints of rashes, abnormal nevi, skin cancer  Gynecological - n/a    Objective:     Visit Vitals  /74 (BP 1 Location: Right arm, BP Patient Position: Sitting)   Pulse 67   Temp 97.6 °F (36.4 °C)   Resp 16   Ht 5' 11\" (1.803 m)   Wt 129.6 kg (285 lb 12.8 oz)   SpO2 100%   BMI 39.86 kg/m²       Physical Examination: General appearance - alert, well appearing, and in no distress  Mental status - alert, oriented to person, place, and time  Eyes - pupils equal and reactive, extraocular eye movements intact  Ears - bilateral TM's and external ear canals normal  Nose - normal and patent, no erythema, discharge or polyps  Mouth - mucous membranes moist, pharynx normal without lesions  Neck - supple, no significant adenopathy  Lymphatics - no palpable lymphadenopathy, no hepatosplenomegaly  Chest - clear to auscultation, no wheezes, rales or rhonchi, symmetric air entry  Heart - normal rate, regular rhythm, normal S1, S2, no murmurs, rubs, clicks or gallops  Abdomen - soft, nontender, nondistended, no masses or organomegaly, tight abdominal wall due to abdominoplasty.   Back exam - full range of motion, no tenderness, palpable spasm or pain on motion  Neurological - alert, oriented, normal speech, no focal findings or movement disorder noted  Musculoskeletal - no joint tenderness, deformity or swelling  Extremities - peripheral pulses normal, no pedal edema, no clubbing or cyanosis  Skin - normal coloration and turgor, no rashes, no suspicious skin lesions noted    Labs / Preoperative Evaluations:     Recent Results (from the past 1008 hour(s))   [facility] SPECIMEN COLLN. Collection Time: 07/03/19  1:54 PM   Result Value Ref Range    [facility] SPECIMEN COL Specimens collected/sent to [facility]     VITAMIN D, 25 HYDROXY    Collection Time: 07/03/19  1:55 PM   Result Value Ref Range    VITAMIN D, 25-HYDROXY 23.2 (L) 32.0 - 199.4 ng/mL   METABOLIC PANEL, COMPREHENSIVE    Collection Time: 07/03/19  1:55 PM   Result Value Ref Range    Glucose 98 70 - 99 mg/dL    BUN 17 6 - 22 mg/dL    Creatinine 0.8 0.5 - 1.2 mg/dL    Sodium 142 133 - 145 mmol/L    Potassium 5.5 3.5 - 5.5 mmol/L    Chloride 101 98 - 110 mmol/L    CO2 25 20 - 32 mmol/L    AST (SGOT) 16 10 - 37 U/L    ALT (SGPT) 12 5 - 40 U/L    Alk.  phosphatase 74 25 - 115 U/L    Bilirubin, total 0.4 0.2 - 1.2 mg/dL    Calcium 10.2 8.4 - 10.4 mg/dL    Protein, total 7.7 6.4 - 8.3 g/dL    Albumin 4.8 3.5 - 5.0 g/dL    A-G Ratio 1.7 1.1 - 2.6 ratio    Globulin 2.9 2.0 - 4.0 g/dL    Anion gap 16.0 mmol/L    GFRAA >60.0 >60.0    GFRNA >60.0 >60.0   FERRITIN    Collection Time: 07/03/19  1:55 PM   Result Value Ref Range    Ferritin 25 22 - 322 ng/mL   IRON    Collection Time: 07/03/19  1:55 PM   Result Value Ref Range    Iron 95 45 - 160 mcg/dL   VITAMIN B12 & FOLATE    Collection Time: 07/03/19  1:55 PM   Result Value Ref Range    Vitamin B12 905 211 - 911 pg/mL    Folate 10.62 >=3.10 ng/mL   CBC WITH AUTOMATED DIFF    Collection Time: 07/03/19  1:55 PM   Result Value Ref Range    WBC 7.3 4.0 - 11.0 K/uL    RBC 5.26 3.80 - 5.80 M/uL    HGB 15.0 13.2 - 17.3 g/dL    HCT 45.0 36.6 - 51.9 %    MCV 86 80 - 95 fL    MCH 29 26 - 34 pg    MCHC 33 31 - 36 g/dL    RDW 12.9 10.0 - 15.5 %    PLATELET 931 997 - 499 K/uL    MPV 10.9 9.0 - 13.0 fL    NEUTROPHILS 65 40 - 75 %    Lymphocytes 26 20 - 45 %    MONOCYTES 7 3 - 12 %    EOSINOPHILS 2 0 - 6 %    BASOPHILS 1 0 - 2 %    ABS. NEUTROPHILS 4.7 1.8 - 7.7 K/uL    ABSOLUTE LYMPHOCYTE COUNT 1.9 1.0 - 4.8 K/uL    ABS. MONOCYTES 0.5 0.1 - 1.0 K/uL    ABS. EOSINOPHILS 0.2 0.0 - 0.5 K/uL    ABS. BASOPHILS 0.0 0.0 - 0.2 K/uL   VITAMIN B1, WHOLE BLOOD    Collection Time: 07/03/19  1:55 PM   Result Value Ref Range    VITAMIN B1, WHOLE BLOOD 117.4 66.5 - 200.0 nmol/L   EKG, 12 LEAD, INITIAL    Collection Time: 08/05/19  2:38 PM   Result Value Ref Range    Ventricular Rate 60 BPM    Atrial Rate 60 BPM    P-R Interval 160 ms    QRS Duration 98 ms    Q-T Interval 392 ms    QTC Calculation (Bezet) 392 ms    Calculated P Axis 31 degrees    Calculated R Axis -4 degrees    Calculated T Axis 43 degrees    Diagnosis       Normal sinus rhythm  Moderate voltage criteria for LVH, may be normal variant  Borderline ECG  When compared with ECG of 08-JAN-2018 08:07,  No significant change was found         Assessment:     Morbid obesity with associated comorbidity    Plan:     laparoscopic gastric bypass surgery as a conversion from sleeve gastrectomy due to severe reflux disease    This is a 28 y.o. male with a BMI of Body mass index is 39.86 kg/m². and the weight-related co-morbidties as noted above. Lucille Bhatia meets the NIH criteria for bariatric surgery based upon the BMI of Body mass index is 39.86 kg/m². and multiple weight-related co-morbidties.  Lucille Bhatia has elected laparoscopic gastric bypass as his intervention of choice for treatment of morbid obestiy through surgical means secondary to its uniform results,  profound baseline suppression of hunger and pace at which weight is lost.    In the office today, following Beau's history and physical examination, a 40 minute discussion regarding the anatomic alterations for the laparoscopic gastric bypass  was undertaken. The dietary expectations and the patient  dependent factors for success were thoroughly discussed, to include the need for interval follow-up and long-term dietary changes associated with success. The possible short and long term  complications of the gastric bypass were also discussed, to include but not limited to;death, DVT/PE, staple line leak, bleeding, stricture formation, infection,internal hernia  and pouch dilation. Specific weight related outcomes for success were also discussed with an emphasis on careful and close follow-up with the first year and Dietary behavior modification over the first years as baseline cyclical hunger returns  The patient expressed an understanding of the above factors, and his questions were answered in their entirety. Today, the patient had all of his questions answered and the decision was made today that the patient's preoperative evaluation is acceptable for them  to proceed with bariatric surgery  choosing adjustable gastric bypass as his surgical option as a conversion procedure due to sever GERD. The patient understands the plan of action        Secondary Diagnoses:     DVT / Pulmonary Embolus Risk - The patient is at a higher risk for post operative DVT / pulmonary embolus secondary to their morbid obese status, relative sedentary lifestyle, and impending general anesthetic. We will plan to use anticoagulation therapy pre and post operative as well as  pneumatic compression devices and encourage ambulation once on the hospital nursing floor. The need for possible at home anticoagulation therapy has also been discussed and any decision on this matter will be made during post operative evaluations. The patient understands that their efforts at ambulation are of vital importance to reduce the risk of this complication thus placing significant burden on them as to the prevention of such issues.  Signs and symptoms of DVT / PE have been discussed with the patient and they have been instructed to call the office if any these occur in the \"at home\" post op phase    Signed By: Nomi Rider MD     August 5, 2019

## 2019-08-05 NOTE — H&P (VIEW-ONLY)
Gastric Bypass - History and Physical    Subjective: The patient is a 28 y.o. obese male with a Body mass index is 39.86 kg/m². Dragan Recio he presents now to review their work up to date to see if they are a candidate for surgery and whether or not to proceed with the previously requested procedure. He had a sleeve resection done via this office 3.25 years ago. He now has intractable GERD and presents now for conversion procedure to the gastric bypass. Interestingly this all occurred after his abdominoplasty. Bariatric comorbidities continue to include:   Patient Active Problem List   Diagnosis Code    Morbid obesity with body mass index of 60.0-69.9 in adult ([facility] [address] E66.01, Z68.44    Back pain M54.9    Smoking history Z87.891    Strabismus H50.9    GERD (gastroesophageal reflux disease) K21.9    Morbid obesity with BMI of 60.0-69.9, adult ([facility] [address] E66.01, Z68.44    S/P bariatric surgery Z98.84    Intestinal malabsorption K90.9    Hypovitaminosis D E55.9       They have been generally well prior to this visit and have had no recent significant illnesses. The patient has had no gastrointestinal issues that would preclude them from proceeding with the surgery they have chosen. Yue Arenas has recently tried a preoperative weight loss program  in addition to seeing a bariatric nutritionist preoperatively. We have discussed on at least one other occasion about the various types of surgical weight loss procedures and they have considered these options after our initial consultation. We have once again discussed these procedures in detail and they have now decided on a surgical procedure. They present today to discuss this and confirm that their evaluation pre operatively is acceptable to continue with surgery. The patient desires laparoscopic gastric bypass surgery for surgical weight loss and GERD control. The patients goal weight is 200lb.  (this represents a BMI of 27)    These goals are consistent with expected outcomes of their desired operation. his Medical goals are resolution of these health issues. Patient Active Problem List    Diagnosis Date Noted    Hypovitaminosis D 2017    S/P bariatric surgery     Intestinal malabsorption     Morbid obesity with BMI of 60.0-69.9, adult ([facility] [address] 03/15/2016    Morbid obesity with body mass index of 60.0-69.9 in adult ([facility]     Back pain     Smoking history     Strabismus     GERD (gastroesophageal reflux disease)       Past Surgical History:   Procedure Laterality Date    HX ABDOMINOPLASTY      2018    HX GI  03/15/2016    gastric sleeve    HX WISDOM TEETH EXTRACTION        Social History     Tobacco Use    Smoking status: Former Smoker     Packs/day: 1.00     Years: 13.00     Pack years: 13.00     Last attempt to quit: 2013     Years since quittin.5    Smokeless tobacco: Former User   Substance Use Topics    Alcohol use: No     Alcohol/week: 0.0 standard drinks      No family history on file. Current Outpatient Medications   Medication Sig Dispense Refill    ergocalciferol (ERGOCALCIFEROL) 50,000 unit capsule Take 1 Cap by mouth two (2) times a week. Indications: Vitamin D Deficiency (High Dose Therapy) 52 Cap 1    cyanocobalamin (VITAMIN B12) 1,000 mcg/mL injection 1 mL by SubCUTAneous route every thirty (30) days. Indications: Prevention of Vitamin B12 Deficiency 3 mL 4    Syringe with Needle, Disp, 3 mL 25 x 5/8\" syrg 1 mL by SubCUTAneous route every thirty (30) days. 15 Syringe 0    omeprazole (PRILOSEC) 40 mg capsule Take 1 Cap by mouth daily. Indications: gastroesophageal reflux disease 30 Cap 3    multivitamin (ONE A DAY) tablet Take 1 Tab by mouth daily.  BIOTIN PO Take 5,000 mcg by mouth daily.        No Known Allergies     Review of Systems:        General - No history or complaints of unexpected fever, chills, or weight loss  Head/Neck - No history or complaints of headache, diplopia, dysphagia, hearing loss  Cardiac - No history or complaints of chest pain, palpitations, murmur, or shortness of breath  Pulmonary - No history or complaints of shortness of breath, productive cough, hemoptysis  Gastrointestinal - (+) history of reflux, No history or complaints of abdominal pain, obstipation/constipation, blood per rectum  Genitourinary - No history or complaints of hematuria/dysuria, stress urinary incontinence symptoms, or renal lithiasis  Musculoskeletal - No history or complaints of joint pain or muscular weakness  Hematologic - No history or complaints of bleeding disorders, blood transfusions, sickle cell anemia  Neurologic - No history or complaints of  migraine headaches, seizure activity, syncopal episodes, TIA or stroke  Integumentary - No history or complaints of rashes, abnormal nevi, skin cancer  Gynecological - n/a    Objective:     Visit Vitals  /74 (BP 1 Location: Right arm, BP Patient Position: Sitting)   Pulse 67   Temp 97.6 °F (36.4 °C)   Resp 16   Ht 5' 11\" (1.803 m)   Wt 129.6 kg (285 lb 12.8 oz)   SpO2 100%   BMI 39.86 kg/m²       Physical Examination: General appearance - alert, well appearing, and in no distress  Mental status - alert, oriented to person, place, and time  Eyes - pupils equal and reactive, extraocular eye movements intact  Ears - bilateral TM's and external ear canals normal  Nose - normal and patent, no erythema, discharge or polyps  Mouth - mucous membranes moist, pharynx normal without lesions  Neck - supple, no significant adenopathy  Lymphatics - no palpable lymphadenopathy, no hepatosplenomegaly  Chest - clear to auscultation, no wheezes, rales or rhonchi, symmetric air entry  Heart - normal rate, regular rhythm, normal S1, S2, no murmurs, rubs, clicks or gallops  Abdomen - soft, nontender, nondistended, no masses or organomegaly, tight abdominal wall due to abdominoplasty.   Back exam - full range of motion, no tenderness, palpable spasm or pain on motion  Neurological - alert, oriented, normal speech, no focal findings or movement disorder noted  Musculoskeletal - no joint tenderness, deformity or swelling  Extremities - peripheral pulses normal, no pedal edema, no clubbing or cyanosis  Skin - normal coloration and turgor, no rashes, no suspicious skin lesions noted    Labs / Preoperative Evaluations:     Recent Results (from the past 1008 hour(s))   [facility] SPECIMEN COLLN. Collection Time: 07/03/19  1:54 PM   Result Value Ref Range    [facility] SPECIMEN COL Specimens collected/sent to [facility]     VITAMIN D, 25 HYDROXY    Collection Time: 07/03/19  1:55 PM   Result Value Ref Range    VITAMIN D, 25-HYDROXY 23.2 (L) 32.0 - 605.0 ng/mL   METABOLIC PANEL, COMPREHENSIVE    Collection Time: 07/03/19  1:55 PM   Result Value Ref Range    Glucose 98 70 - 99 mg/dL    BUN 17 6 - 22 mg/dL    Creatinine 0.8 0.5 - 1.2 mg/dL    Sodium 142 133 - 145 mmol/L    Potassium 5.5 3.5 - 5.5 mmol/L    Chloride 101 98 - 110 mmol/L    CO2 25 20 - 32 mmol/L    AST (SGOT) 16 10 - 37 U/L    ALT (SGPT) 12 5 - 40 U/L    Alk.  phosphatase 74 25 - 115 U/L    Bilirubin, total 0.4 0.2 - 1.2 mg/dL    Calcium 10.2 8.4 - 10.4 mg/dL    Protein, total 7.7 6.4 - 8.3 g/dL    Albumin 4.8 3.5 - 5.0 g/dL    A-G Ratio 1.7 1.1 - 2.6 ratio    Globulin 2.9 2.0 - 4.0 g/dL    Anion gap 16.0 mmol/L    GFRAA >60.0 >60.0    GFRNA >60.0 >60.0   FERRITIN    Collection Time: 07/03/19  1:55 PM   Result Value Ref Range    Ferritin 25 22 - 322 ng/mL   IRON    Collection Time: 07/03/19  1:55 PM   Result Value Ref Range    Iron 95 45 - 160 mcg/dL   VITAMIN B12 & FOLATE    Collection Time: 07/03/19  1:55 PM   Result Value Ref Range    Vitamin B12 905 211 - 911 pg/mL    Folate 10.62 >=3.10 ng/mL   CBC WITH AUTOMATED DIFF    Collection Time: 07/03/19  1:55 PM   Result Value Ref Range    WBC 7.3 4.0 - 11.0 K/uL    RBC 5.26 3.80 - 5.80 M/uL    HGB 15.0 13.2 - 17.3 g/dL    HCT 45.0 36.6 - 51.9 %    MCV 86 80 - 95 fL    MCH 29 26 - 34 pg    MCHC 33 31 - 36 g/dL    RDW 12.9 10.0 - 15.5 %    PLATELET 169 235 - 593 K/uL    MPV 10.9 9.0 - 13.0 fL    NEUTROPHILS 65 40 - 75 %    Lymphocytes 26 20 - 45 %    MONOCYTES 7 3 - 12 %    EOSINOPHILS 2 0 - 6 %    BASOPHILS 1 0 - 2 %    ABS. NEUTROPHILS 4.7 1.8 - 7.7 K/uL    ABSOLUTE LYMPHOCYTE COUNT 1.9 1.0 - 4.8 K/uL    ABS. MONOCYTES 0.5 0.1 - 1.0 K/uL    ABS. EOSINOPHILS 0.2 0.0 - 0.5 K/uL    ABS. BASOPHILS 0.0 0.0 - 0.2 K/uL   VITAMIN B1, WHOLE BLOOD    Collection Time: 07/03/19  1:55 PM   Result Value Ref Range    VITAMIN B1, WHOLE BLOOD 117.4 66.5 - 200.0 nmol/L   EKG, 12 LEAD, INITIAL    Collection Time: 08/05/19  2:38 PM   Result Value Ref Range    Ventricular Rate 60 BPM    Atrial Rate 60 BPM    P-R Interval 160 ms    QRS Duration 98 ms    Q-T Interval 392 ms    QTC Calculation (Bezet) 392 ms    Calculated P Axis 31 degrees    Calculated R Axis -4 degrees    Calculated T Axis 43 degrees    Diagnosis       Normal sinus rhythm  Moderate voltage criteria for LVH, may be normal variant  Borderline ECG  When compared with ECG of 08-JAN-2018 08:07,  No significant change was found         Assessment:     Morbid obesity with associated comorbidity    Plan:     laparoscopic gastric bypass surgery as a conversion from sleeve gastrectomy due to severe reflux disease    This is a 28 y.o. male with a BMI of Body mass index is 39.86 kg/m². and the weight-related co-morbidties as noted above. Adelina Ingram meets the NIH criteria for bariatric surgery based upon the BMI of Body mass index is 39.86 kg/m². and multiple weight-related co-morbidties.  Adelina Ingram has elected laparoscopic gastric bypass as his intervention of choice for treatment of morbid obestiy through surgical means secondary to its uniform results,  profound baseline suppression of hunger and pace at which weight is lost.    In the office today, following Beau's history and physical examination, a 40 minute discussion regarding the anatomic alterations for the laparoscopic gastric bypass  was undertaken. The dietary expectations and the patient  dependent factors for success were thoroughly discussed, to include the need for interval follow-up and long-term dietary changes associated with success. The possible short and long term  complications of the gastric bypass were also discussed, to include but not limited to;death, DVT/PE, staple line leak, bleeding, stricture formation, infection,internal hernia  and pouch dilation. Specific weight related outcomes for success were also discussed with an emphasis on careful and close follow-up with the first year and Dietary behavior modification over the first years as baseline cyclical hunger returns  The patient expressed an understanding of the above factors, and his questions were answered in their entirety. Today, the patient had all of his questions answered and the decision was made today that the patient's preoperative evaluation is acceptable for them  to proceed with bariatric surgery  choosing adjustable gastric bypass as his surgical option as a conversion procedure due to sever GERD. The patient understands the plan of action        Secondary Diagnoses:     DVT / Pulmonary Embolus Risk - The patient is at a higher risk for post operative DVT / pulmonary embolus secondary to their morbid obese status, relative sedentary lifestyle, and impending general anesthetic. We will plan to use anticoagulation therapy pre and post operative as well as  pneumatic compression devices and encourage ambulation once on the hospital nursing floor. The need for possible at home anticoagulation therapy has also been discussed and any decision on this matter will be made during post operative evaluations. The patient understands that their efforts at ambulation are of vital importance to reduce the risk of this complication thus placing significant burden on them as to the prevention of such issues.  Signs and symptoms of DVT / PE have been discussed with the patient and they have been instructed to call the office if any these occur in the \"at home\" post op phase    Signed By: Eric Becerril MD     August 5, 2019

## 2019-08-05 NOTE — PROGRESS NOTES
Dada Barrios presents today for   Chief Complaint   Patient presents with    Follow-up     Pt is here today for his follow up       Is someone accompanying this pt? yes    Is the patient using any DME equipment during OV? no    Depression Screening:  3 most recent PHQ Screens 8/5/2019   Little interest or pleasure in doing things Not at all   Feeling down, depressed, irritable, or hopeless Not at all   Total Score PHQ 2 0       Learning Assessment:  Learning Assessment 8/5/2019   PRIMARY LEARNER Patient   HIGHEST LEVEL OF EDUCATION - PRIMARY LEARNER  [facility] PRIMARY LEARNER NONE   CO-LEARNER CAREGIVER No   PRIMARY LANGUAGE ENGLISH    NEED No   LEARNER PREFERENCE PRIMARY DEMONSTRATION   LEARNING SPECIAL TOPICS no   ANSWERED BY patient   RELATIONSHIP SELF       Abuse Screening:  Abuse Screening Questionnaire 8/5/2019   Do you ever feel afraid of your partner? N   Are you in a relationship with someone who physically or mentally threatens you? N   Is it safe for you to go home? Y       Fall Risk  No flowsheet data found. Coordination of Care:  1. Have you been to the ER, urgent care clinic since your last visit? Hospitalized since your last visit? no    2. Have you seen or consulted any other health care providers outside of the [address] [place] [address] since your last visit? Include any pap smears or colon screening.  no

## 2019-08-06 LAB
A-G RATIO,AGRAT: 1.6 RATIO (ref 1.1–2.6)
ABSOLUTE LYMPHOCYTE COUNT, 10803: 2.4 K/UL (ref 1–4.8)
ALBUMIN SERPL-MCNC: 4.7 G/DL (ref 3.5–5)
ALP SERPL-CCNC: 73 U/L (ref 25–115)
ALT SERPL-CCNC: 17 U/L (ref 5–40)
ANION GAP SERPL CALC-SCNC: 11 MMOL/L
AST SERPL W P-5'-P-CCNC: 19 U/L (ref 10–37)
BASOPHILS # BLD: 0.1 K/UL (ref 0–0.2)
BASOPHILS NFR BLD: 1 % (ref 0–2)
BILIRUB SERPL-MCNC: 0.3 MG/DL (ref 0.2–1.2)
BUN SERPL-MCNC: 15 MG/DL (ref 6–22)
CALCIUM SERPL-MCNC: 10.1 MG/DL (ref 8.4–10.4)
CHLORIDE SERPL-SCNC: 102 MMOL/L (ref 98–110)
CO2 SERPL-SCNC: 27 MMOL/L (ref 20–32)
CREAT SERPL-MCNC: 0.8 MG/DL (ref 0.5–1.2)
EOSINOPHIL # BLD: 0.1 K/UL (ref 0–0.5)
EOSINOPHIL NFR BLD: 2 % (ref 0–6)
ERYTHROCYTE [DISTWIDTH] IN BLOOD BY AUTOMATED COUNT: 13.3 % (ref 10–15.5)
GFRAA, 66117: >60
GFRNA, 66118: >60
GLOBULIN,GLOB: 2.9 G/DL (ref 2–4)
GLUCOSE SERPL-MCNC: 87 MG/DL (ref 70–99)
GRANULOCYTES,GRANS: 58 % (ref 40–75)
HCT VFR BLD AUTO: 44.8 % (ref 36.6–51.9)
HGB BLD-MCNC: 14.8 G/DL (ref 13.2–17.3)
IMMATURE PLATELET FRACTION: 2.4 % (ref 1.1–7.1)
LYMPHOCYTES, LYMLT: 31 % (ref 20–45)
MCH RBC QN AUTO: 29 PG (ref 26–34)
MCHC RBC AUTO-ENTMCNC: 33 G/DL (ref 31–36)
MCV RBC AUTO: 86 FL (ref 80–95)
MONOCYTES # BLD: 0.7 K/UL (ref 0.1–1)
MONOCYTES NFR BLD: 9 % (ref 3–12)
NEUTROPHILS # BLD AUTO: 4.4 K/UL (ref 1.8–7.7)
PLATELET # BLD AUTO: 300 K/UL (ref 140–440)
PMV BLD AUTO: 11 FL (ref 9–13)
POTASSIUM SERPL-SCNC: 4.7 MMOL/L (ref 3.5–5.5)
PROT SERPL-MCNC: 7.6 G/DL (ref 6.4–8.3)
RBC # BLD AUTO: 5.2 M/UL (ref 3.8–5.8)
SODIUM SERPL-SCNC: 140 MMOL/L (ref 133–145)
WBC # BLD AUTO: 7.6 K/UL (ref 4–11)

## 2019-08-14 ENCOUNTER — ANESTHESIA EVENT (OUTPATIENT)
Dept: SURGERY | Age: 34
End: 2019-08-14
Payer: MEDICAID

## 2019-08-15 RX ORDER — FENTANYL CITRATE 50 UG/ML
50 INJECTION, SOLUTION INTRAMUSCULAR; INTRAVENOUS
Status: CANCELLED | OUTPATIENT
Start: 2019-08-15

## 2019-08-15 RX ORDER — HYDROMORPHONE HYDROCHLORIDE 2 MG/ML
0.2 INJECTION, SOLUTION INTRAMUSCULAR; INTRAVENOUS; SUBCUTANEOUS
Status: CANCELLED | OUTPATIENT
Start: 2019-08-15

## 2019-08-15 RX ORDER — OXYCODONE AND ACETAMINOPHEN 5; 325 MG/1; MG/1
1 TABLET ORAL AS NEEDED
Status: CANCELLED | OUTPATIENT
Start: 2019-08-15

## 2019-08-15 RX ORDER — MAGNESIUM SULFATE 100 %
4 CRYSTALS MISCELLANEOUS AS NEEDED
Status: CANCELLED | OUTPATIENT
Start: 2019-08-15

## 2019-08-15 RX ORDER — FENTANYL CITRATE 50 UG/ML
25 INJECTION, SOLUTION INTRAMUSCULAR; INTRAVENOUS AS NEEDED
Status: CANCELLED | OUTPATIENT
Start: 2019-08-15

## 2019-08-15 RX ORDER — NALOXONE HYDROCHLORIDE 0.4 MG/ML
0.1 INJECTION, SOLUTION INTRAMUSCULAR; INTRAVENOUS; SUBCUTANEOUS AS NEEDED
Status: CANCELLED | OUTPATIENT
Start: 2019-08-15

## 2019-08-15 RX ORDER — SODIUM CHLORIDE 9 MG/ML
125 INJECTION, SOLUTION INTRAVENOUS CONTINUOUS
Status: CANCELLED | OUTPATIENT
Start: 2019-08-15

## 2019-08-15 RX ORDER — ONDANSETRON 2 MG/ML
4 INJECTION INTRAMUSCULAR; INTRAVENOUS ONCE
Status: CANCELLED | OUTPATIENT
Start: 2019-08-15 | End: 2019-08-15

## 2019-08-16 ENCOUNTER — HOSPITAL ENCOUNTER (OUTPATIENT)
Age: 34
LOS: 1 days | Discharge: HOME | End: 2019-08-16
Attending: SPECIALIST | Admitting: SPECIALIST
Payer: MEDICAID

## 2019-08-16 ENCOUNTER — ANESTHESIA (OUTPATIENT)
Dept: SURGERY | Age: 34
End: 2019-08-16
Payer: MEDICAID

## 2019-08-16 VITALS
DIASTOLIC BLOOD PRESSURE: 76 MMHG | RESPIRATION RATE: 16 BRPM | HEART RATE: 71 BPM | SYSTOLIC BLOOD PRESSURE: 126 MMHG | WEIGHT: 282.38 LBS | OXYGEN SATURATION: 100 % | HEIGHT: 71 IN | BODY MASS INDEX: 39.53 KG/M2 | TEMPERATURE: 97.9 F

## 2019-08-16 PROCEDURE — 74011250637 HC RX REV CODE- 250/637: Performed by: SPECIALIST

## 2019-08-16 PROCEDURE — 96372 THER/PROPH/DIAG INJ SC/IM: CPT

## 2019-08-16 PROCEDURE — 74011000250 HC RX REV CODE- 250: Performed by: SPECIALIST

## 2019-08-16 PROCEDURE — 74011250636 HC RX REV CODE- 250/636: Performed by: SPECIALIST

## 2019-08-16 PROCEDURE — 77030020782 HC GWN BAIR PAWS FLX 3M -B: Performed by: SPECIALIST

## 2019-08-16 PROCEDURE — 74011250636 HC RX REV CODE- 250/636

## 2019-08-16 PROCEDURE — 96374 THER/PROPH/DIAG INJ IV PUSH: CPT

## 2019-08-16 RX ORDER — FAMOTIDINE 20 MG/50ML
20 INJECTION, SOLUTION INTRAVENOUS ONCE
Status: DISCONTINUED | OUTPATIENT
Start: 2019-08-16 | End: 2019-08-16 | Stop reason: CLARIF

## 2019-08-16 RX ORDER — NYSTATIN 100000 [USP'U]/ML
500000 SUSPENSION ORAL
Status: COMPLETED | OUTPATIENT
Start: 2019-08-16 | End: 2019-08-16

## 2019-08-16 RX ORDER — SODIUM CHLORIDE, SODIUM LACTATE, POTASSIUM CHLORIDE, CALCIUM CHLORIDE 600; 310; 30; 20 MG/100ML; MG/100ML; MG/100ML; MG/100ML
125 INJECTION, SOLUTION INTRAVENOUS CONTINUOUS
Status: DISCONTINUED | OUTPATIENT
Start: 2019-08-16 | End: 2019-08-16 | Stop reason: HOSPADM

## 2019-08-16 RX ORDER — ACETAMINOPHEN 10 MG/ML
1000 INJECTION, SOLUTION INTRAVENOUS ONCE
Status: DISCONTINUED | OUTPATIENT
Start: 2019-08-16 | End: 2019-08-16 | Stop reason: HOSPADM

## 2019-08-16 RX ORDER — ENOXAPARIN SODIUM 100 MG/ML
40 INJECTION SUBCUTANEOUS ONCE
Status: COMPLETED | OUTPATIENT
Start: 2019-08-16 | End: 2019-08-16

## 2019-08-16 RX ADMIN — NYSTATIN 500000 UNITS: 100000 SUSPENSION ORAL at 06:29

## 2019-08-16 RX ADMIN — ENOXAPARIN SODIUM 40 MG: 40 INJECTION SUBCUTANEOUS at 06:29

## 2019-08-16 RX ADMIN — SODIUM CHLORIDE, SODIUM LACTATE, POTASSIUM CHLORIDE, AND CALCIUM CHLORIDE 125 ML/HR: 600; 310; 30; 20 INJECTION, SOLUTION INTRAVENOUS at 06:15

## 2019-08-16 RX ADMIN — FAMOTIDINE 20 MG: 10 INJECTION, SOLUTION INTRAVENOUS at 06:29

## 2019-08-16 NOTE — ANESTHESIA PREPROCEDURE EVALUATION
Anesthetic History   No history of anesthetic complications     Pertinent negatives: No PONV       Review of Systems / Medical History  Patient summary reviewed, nursing notes reviewed and pertinent labs reviewed    Pulmonary            Asthma ( childhood)   Pertinent negatives: No COPD and smoker     Neuro/Psych              Cardiovascular  Within defined limits              Pertinent negatives: No hypertension, past MI and CAD  Exercise tolerance: >4 METS     GI/Hepatic/Renal     GERD: well controlled        Pertinent negatives: No liver disease and renal disease   Endo/Other        Obesity  Pertinent negatives: No diabetes, hypothyroidism, hyperthyroidism and morbid obesity   Other Findings   Comments: etoh neg           Physical Exam    Airway  Mallampati: II  TM Distance: 4 - 6 cm  Neck ROM: normal range of motion   Mouth opening: Normal    Comments: beard Cardiovascular    Rhythm: regular  Rate: normal         Dental  No notable dental hx       Pulmonary  Breath sounds clear to auscultation               Abdominal  GI exam deferred      Comments: Dysconjugate gaze. Left eye is function one.   Other Findings            Anesthetic Plan    ASA: 2  Anesthesia type: general          Induction: Intravenous  Anesthetic plan and risks discussed with: Patient and Family

## 2019-08-16 NOTE — PROGRESS NOTES
conducted a pre-surgery visit with Dalia Donahue, who is a 28 y.o.,male. The  provided the following Interventions:  Initiated a relationship of care and support. Offered prayer and assurance of continued prayers on patient's behalf. Plan:  Chaplains will continue to follow and will provide pastoral care on an as needed/requested basis.  recommends bedside caregivers page  on duty if patient shows signs of acute spiritual or emotional distress.       [address] [facility]   [phone]

## 2019-08-17 NOTE — ANESTHESIA POSTPROCEDURE EVALUATION
Procedure(s):  LAPAROSCOPIC GASTRIC BYPASS WITH POSSIBLE LIVER BIOPSY AND INTROPERATIVE ENDOSCOPY. general    Anesthesia Post Evaluation        Comments: Post-Anesthesia Evaluation and Assessment    Cardiovascular Function/Vital Signs  /76 (BP 1 Location: Right arm, BP Patient Position: At rest;Pre-activity; Sitting)   Pulse 71   Temp 36.6 °C (97.9 °F)   Resp 16   Ht 5' 11\" (1.803 m)   Wt 128.1 kg (282 lb 6 oz)   SpO2 100%   BMI 39.38 kg/m²     Patient is status post Procedure(s):  LAPAROSCOPIC GASTRIC BYPASS WITH POSSIBLE LIVER BIOPSY AND INTROPERATIVE ENDOSCOPY. Nausea/Vomiting: Controlled. Postoperative hydration reviewed and adequate. Pain:  Pain Scale 1: Numeric (0 - 10) (08/16/19 0544)  Pain Intensity 1: 0 (08/16/19 0544)   Managed. Neurological Status:   Neuro (WDL): Within Defined Limits (08/16/19 0609)   At baseline. Mental Status and Level of Consciousness: Arousable. Pulmonary Status:   O2 Device: Room air (08/16/19 0544)   Adequate oxygenation and airway patent. Complications related to anesthesia: None    Post-anesthesia assessment completed. No concerns. Patient has met all discharge requirements. Signed By: Joe Reno MD    August 17, 2019                     No vitals data found for the desired time range.

## 2019-08-18 DIAGNOSIS — K90.9 INTESTINAL MALABSORPTION, UNSPECIFIED TYPE: ICD-10-CM

## 2019-08-22 ENCOUNTER — HOSPITAL ENCOUNTER (INPATIENT)
Age: 34
LOS: 1 days | Discharge: HOME | DRG: 220 | End: 2019-08-23
Attending: SPECIALIST | Admitting: SPECIALIST
Payer: MEDICAID

## 2019-08-22 ENCOUNTER — NURSE NAVIGATOR (OUTPATIENT)
Dept: SURGERY | Age: 34
End: 2019-08-22

## 2019-08-22 ENCOUNTER — ANESTHESIA (OUTPATIENT)
Dept: SURGERY | Age: 34
DRG: 220 | End: 2019-08-22
Payer: MEDICAID

## 2019-08-22 ENCOUNTER — ANESTHESIA EVENT (OUTPATIENT)
Dept: SURGERY | Age: 34
DRG: 220 | End: 2019-08-22
Payer: MEDICAID

## 2019-08-22 LAB
ABO + RH BLD: NORMAL
BLOOD GROUP ANTIBODIES SERPL: NORMAL
SPECIMEN EXP DATE BLD: NORMAL

## 2019-08-22 PROCEDURE — 77030008602 HC TRCR ENDOSC EPATH J&J -B: Performed by: SPECIALIST

## 2019-08-22 PROCEDURE — 0FB24ZX EXCISION OF LEFT LOBE LIVER, PERCUTANEOUS ENDOSCOPIC APPROACH, DIAGNOSTIC: ICD-10-PCS | Performed by: SPECIALIST

## 2019-08-22 PROCEDURE — 65270000029 HC RM PRIVATE

## 2019-08-22 PROCEDURE — 77030002916 HC SUT ETHLN J&J -A: Performed by: SPECIALIST

## 2019-08-22 PROCEDURE — 77030012407 HC DRN WND BARD -B: Performed by: SPECIALIST

## 2019-08-22 PROCEDURE — 0FN04ZZ RELEASE LIVER, PERCUTANEOUS ENDOSCOPIC APPROACH: ICD-10-PCS | Performed by: SPECIALIST

## 2019-08-22 PROCEDURE — 77030002933 HC SUT MCRYL J&J -A: Performed by: SPECIALIST

## 2019-08-22 PROCEDURE — 77030002896 HC SUT CLP ABSRB J&J -B: Performed by: SPECIALIST

## 2019-08-22 PROCEDURE — 36415 COLL VENOUS BLD VENIPUNCTURE: CPT

## 2019-08-22 PROCEDURE — 0DN64ZZ RELEASE STOMACH, PERCUTANEOUS ENDOSCOPIC APPROACH: ICD-10-PCS | Performed by: SPECIALIST

## 2019-08-22 PROCEDURE — 77030020782 HC GWN BAIR PAWS FLX 3M -B: Performed by: SPECIALIST

## 2019-08-22 PROCEDURE — 76010000132 HC OR TIME 2.5 TO 3 HR: Performed by: SPECIALIST

## 2019-08-22 PROCEDURE — 77030009426 HC FCPS BIOP ENDOSC BSC -B: Performed by: SPECIALIST

## 2019-08-22 PROCEDURE — 77030010030: Performed by: SPECIALIST

## 2019-08-22 PROCEDURE — 88307 TISSUE EXAM BY PATHOLOGIST: CPT

## 2019-08-22 PROCEDURE — 77030002966 HC SUT PDS J&J -A: Performed by: SPECIALIST

## 2019-08-22 PROCEDURE — 88305 TISSUE EXAM BY PATHOLOGIST: CPT

## 2019-08-22 PROCEDURE — 77030022585 HC SEAL FBRN EVICEL J&J -F: Performed by: SPECIALIST

## 2019-08-22 PROCEDURE — 77030002912 HC SUT ETHBND J&J -A: Performed by: SPECIALIST

## 2019-08-22 PROCEDURE — 77030027876 HC STPLR ENDOSC FLX PWR J&J -G1: Performed by: SPECIALIST

## 2019-08-22 PROCEDURE — 77010033678 HC OXYGEN DAILY

## 2019-08-22 PROCEDURE — 86900 BLOOD TYPING SEROLOGIC ABO: CPT

## 2019-08-22 PROCEDURE — 77030009968 HC RELD STPLR ENDOSC J&J -D: Performed by: SPECIALIST

## 2019-08-22 PROCEDURE — 77030008518 HC TBNG INSUF ENDO STRY -B: Performed by: SPECIALIST

## 2019-08-22 PROCEDURE — 77030018836 HC SOL IRR NACL ICUM -A: Performed by: SPECIALIST

## 2019-08-22 PROCEDURE — 74011250636 HC RX REV CODE- 250/636: Performed by: SPECIALIST

## 2019-08-22 PROCEDURE — 76210000006 HC OR PH I REC 0.5 TO 1 HR: Performed by: SPECIALIST

## 2019-08-22 PROCEDURE — 77030037892: Performed by: SPECIALIST

## 2019-08-22 PROCEDURE — 77030008603 HC TRCR ENDOSC EPATH J&J -C: Performed by: SPECIALIST

## 2019-08-22 PROCEDURE — 74011250636 HC RX REV CODE- 250/636

## 2019-08-22 PROCEDURE — 0D164ZA BYPASS STOMACH TO JEJUNUM, PERCUTANEOUS ENDOSCOPIC APPROACH: ICD-10-PCS | Performed by: SPECIALIST

## 2019-08-22 PROCEDURE — 77030040361 HC SLV COMPR DVT MDII -B: Performed by: SPECIALIST

## 2019-08-22 PROCEDURE — 74011000250 HC RX REV CODE- 250: Performed by: SPECIALIST

## 2019-08-22 PROCEDURE — 77030036732 HC RELD STPLR VASC J&J -F: Performed by: SPECIALIST

## 2019-08-22 PROCEDURE — 74011000250 HC RX REV CODE- 250

## 2019-08-22 PROCEDURE — 88313 SPECIAL STAINS GROUP 2: CPT

## 2019-08-22 PROCEDURE — 77030014008 HC SPNG HEMSTAT J&J -C: Performed by: SPECIALIST

## 2019-08-22 PROCEDURE — 77030011810 HC STPLR ENDOSC J&J -G: Performed by: SPECIALIST

## 2019-08-22 PROCEDURE — 77030003580 HC NDL INSUF VERES J&J -B: Performed by: SPECIALIST

## 2019-08-22 PROCEDURE — 77030020255 HC SOL INJ LR 1000ML BG: Performed by: SPECIALIST

## 2019-08-22 PROCEDURE — 76060000036 HC ANESTHESIA 2.5 TO 3 HR: Performed by: SPECIALIST

## 2019-08-22 PROCEDURE — 74011250637 HC RX REV CODE- 250/637: Performed by: SPECIALIST

## 2019-08-22 PROCEDURE — 77030009967 HC RELD STPLR ENDOSC J&J -C: Performed by: SPECIALIST

## 2019-08-22 PROCEDURE — 77030010515 HC APPL ENDOCLP LIG J&J -B: Performed by: SPECIALIST

## 2019-08-22 PROCEDURE — 0BQT4ZZ REPAIR DIAPHRAGM, PERCUTANEOUS ENDOSCOPIC APPROACH: ICD-10-PCS | Performed by: SPECIALIST

## 2019-08-22 PROCEDURE — 77030034850: Performed by: SPECIALIST

## 2019-08-22 PROCEDURE — 77030002986 HC SUT PROL J&J -A: Performed by: SPECIALIST

## 2019-08-22 PROCEDURE — 77030034154 HC SHR COAG HARM ACE J&J -F: Performed by: SPECIALIST

## 2019-08-22 PROCEDURE — 77030016151 HC PROTCTR LNS DFOG COVD -B: Performed by: SPECIALIST

## 2019-08-22 PROCEDURE — 77030005264 HC CATH JEJU BKR BARD -D: Performed by: SPECIALIST

## 2019-08-22 PROCEDURE — 77030012893: Performed by: SPECIALIST

## 2019-08-22 RX ORDER — INSULIN LISPRO 100 [IU]/ML
INJECTION, SOLUTION INTRAVENOUS; SUBCUTANEOUS ONCE
Status: DISCONTINUED | OUTPATIENT
Start: 2019-08-22 | End: 2019-08-22 | Stop reason: HOSPADM

## 2019-08-22 RX ORDER — ACETAMINOPHEN 10 MG/ML
1000 INJECTION, SOLUTION INTRAVENOUS EVERY 6 HOURS
Status: COMPLETED | OUTPATIENT
Start: 2019-08-22 | End: 2019-08-23

## 2019-08-22 RX ORDER — PROPOFOL 10 MG/ML
INJECTION, EMULSION INTRAVENOUS AS NEEDED
Status: DISCONTINUED | OUTPATIENT
Start: 2019-08-22 | End: 2019-08-22 | Stop reason: HOSPADM

## 2019-08-22 RX ORDER — HYDROMORPHONE HYDROCHLORIDE 2 MG/ML
0.2 INJECTION, SOLUTION INTRAMUSCULAR; INTRAVENOUS; SUBCUTANEOUS AS NEEDED
Status: DISCONTINUED | OUTPATIENT
Start: 2019-08-22 | End: 2019-08-22 | Stop reason: HOSPADM

## 2019-08-22 RX ORDER — LIDOCAINE HYDROCHLORIDE 20 MG/ML
INJECTION, SOLUTION EPIDURAL; INFILTRATION; INTRACAUDAL; PERINEURAL AS NEEDED
Status: DISCONTINUED | OUTPATIENT
Start: 2019-08-22 | End: 2019-08-22 | Stop reason: HOSPADM

## 2019-08-22 RX ORDER — MIDAZOLAM HYDROCHLORIDE 1 MG/ML
INJECTION, SOLUTION INTRAMUSCULAR; INTRAVENOUS AS NEEDED
Status: DISCONTINUED | OUTPATIENT
Start: 2019-08-22 | End: 2019-08-22 | Stop reason: HOSPADM

## 2019-08-22 RX ORDER — NALOXONE HYDROCHLORIDE 0.4 MG/ML
0.1 INJECTION, SOLUTION INTRAMUSCULAR; INTRAVENOUS; SUBCUTANEOUS AS NEEDED
Status: DISCONTINUED | OUTPATIENT
Start: 2019-08-22 | End: 2019-08-22 | Stop reason: HOSPADM

## 2019-08-22 RX ORDER — FENTANYL CITRATE 50 UG/ML
INJECTION, SOLUTION INTRAMUSCULAR; INTRAVENOUS AS NEEDED
Status: DISCONTINUED | OUTPATIENT
Start: 2019-08-22 | End: 2019-08-22 | Stop reason: HOSPADM

## 2019-08-22 RX ORDER — ONDANSETRON 2 MG/ML
INJECTION INTRAMUSCULAR; INTRAVENOUS AS NEEDED
Status: DISCONTINUED | OUTPATIENT
Start: 2019-08-22 | End: 2019-08-22 | Stop reason: HOSPADM

## 2019-08-22 RX ORDER — SODIUM CHLORIDE, SODIUM LACTATE, POTASSIUM CHLORIDE, CALCIUM CHLORIDE 600; 310; 30; 20 MG/100ML; MG/100ML; MG/100ML; MG/100ML
125 INJECTION, SOLUTION INTRAVENOUS CONTINUOUS
Status: DISCONTINUED | OUTPATIENT
Start: 2019-08-22 | End: 2019-08-22

## 2019-08-22 RX ORDER — GLYCOPYRROLATE 0.2 MG/ML
INJECTION INTRAMUSCULAR; INTRAVENOUS AS NEEDED
Status: DISCONTINUED | OUTPATIENT
Start: 2019-08-22 | End: 2019-08-22 | Stop reason: HOSPADM

## 2019-08-22 RX ORDER — ENOXAPARIN SODIUM 100 MG/ML
40 INJECTION SUBCUTANEOUS ONCE
Status: COMPLETED | OUTPATIENT
Start: 2019-08-22 | End: 2019-08-22

## 2019-08-22 RX ORDER — FAMOTIDINE 10 MG/ML
20 INJECTION INTRAVENOUS ONCE
Status: COMPLETED | OUTPATIENT
Start: 2019-08-22 | End: 2019-08-22

## 2019-08-22 RX ORDER — KETOROLAC TROMETHAMINE 30 MG/ML
INJECTION, SOLUTION INTRAMUSCULAR; INTRAVENOUS AS NEEDED
Status: DISCONTINUED | OUTPATIENT
Start: 2019-08-22 | End: 2019-08-22 | Stop reason: HOSPADM

## 2019-08-22 RX ORDER — NALOXONE HYDROCHLORIDE 0.4 MG/ML
0.4 INJECTION, SOLUTION INTRAMUSCULAR; INTRAVENOUS; SUBCUTANEOUS AS NEEDED
Status: DISCONTINUED | OUTPATIENT
Start: 2019-08-22 | End: 2019-08-23 | Stop reason: HOSPADM

## 2019-08-22 RX ORDER — ONDANSETRON 2 MG/ML
4 INJECTION INTRAMUSCULAR; INTRAVENOUS
Status: DISCONTINUED | OUTPATIENT
Start: 2019-08-22 | End: 2019-08-23 | Stop reason: HOSPADM

## 2019-08-22 RX ORDER — SODIUM CHLORIDE, SODIUM LACTATE, POTASSIUM CHLORIDE, CALCIUM CHLORIDE 600; 310; 30; 20 MG/100ML; MG/100ML; MG/100ML; MG/100ML
150 INJECTION, SOLUTION INTRAVENOUS CONTINUOUS
Status: DISCONTINUED | OUTPATIENT
Start: 2019-08-22 | End: 2019-08-22 | Stop reason: HOSPADM

## 2019-08-22 RX ORDER — KETOROLAC TROMETHAMINE 30 MG/ML
15 INJECTION, SOLUTION INTRAMUSCULAR; INTRAVENOUS EVERY 6 HOURS
Status: DISCONTINUED | OUTPATIENT
Start: 2019-08-22 | End: 2019-08-23

## 2019-08-22 RX ORDER — HYDROMORPHONE HYDROCHLORIDE 2 MG/ML
INJECTION, SOLUTION INTRAMUSCULAR; INTRAVENOUS; SUBCUTANEOUS AS NEEDED
Status: DISCONTINUED | OUTPATIENT
Start: 2019-08-22 | End: 2019-08-22 | Stop reason: HOSPADM

## 2019-08-22 RX ORDER — EPHEDRINE SULFATE/0.9% NACL/PF 25 MG/5 ML
SYRINGE (ML) INTRAVENOUS AS NEEDED
Status: DISCONTINUED | OUTPATIENT
Start: 2019-08-22 | End: 2019-08-22 | Stop reason: HOSPADM

## 2019-08-22 RX ORDER — HYDROMORPHONE HYDROCHLORIDE 2 MG/ML
0.2 INJECTION, SOLUTION INTRAMUSCULAR; INTRAVENOUS; SUBCUTANEOUS AS NEEDED
Status: DISCONTINUED | OUTPATIENT
Start: 2019-08-22 | End: 2019-08-22

## 2019-08-22 RX ORDER — DIPHENHYDRAMINE HYDROCHLORIDE 50 MG/ML
12.5 INJECTION, SOLUTION INTRAMUSCULAR; INTRAVENOUS
Status: DISCONTINUED | OUTPATIENT
Start: 2019-08-22 | End: 2019-08-22 | Stop reason: HOSPADM

## 2019-08-22 RX ORDER — ONDANSETRON 2 MG/ML
4 INJECTION INTRAMUSCULAR; INTRAVENOUS ONCE
Status: DISCONTINUED | OUTPATIENT
Start: 2019-08-22 | End: 2019-08-22 | Stop reason: HOSPADM

## 2019-08-22 RX ORDER — HYDROCODONE BITARTRATE AND ACETAMINOPHEN 5; 325 MG/1; MG/1
1 TABLET ORAL AS NEEDED
Status: DISCONTINUED | OUTPATIENT
Start: 2019-08-22 | End: 2019-08-22 | Stop reason: HOSPADM

## 2019-08-22 RX ORDER — ALBUTEROL SULFATE 0.83 MG/ML
2.5 SOLUTION RESPIRATORY (INHALATION) AS NEEDED
Status: DISCONTINUED | OUTPATIENT
Start: 2019-08-22 | End: 2019-08-22 | Stop reason: HOSPADM

## 2019-08-22 RX ORDER — MORPHINE SULFATE 10 MG/ML
6 INJECTION, SOLUTION INTRAMUSCULAR; INTRAVENOUS
Status: DISCONTINUED | OUTPATIENT
Start: 2019-08-22 | End: 2019-08-23

## 2019-08-22 RX ORDER — ACETAMINOPHEN 10 MG/ML
1000 INJECTION, SOLUTION INTRAVENOUS ONCE
Status: COMPLETED | OUTPATIENT
Start: 2019-08-22 | End: 2019-08-22

## 2019-08-22 RX ORDER — DIPHENHYDRAMINE HYDROCHLORIDE 50 MG/ML
25 INJECTION, SOLUTION INTRAMUSCULAR; INTRAVENOUS
Status: DISCONTINUED | OUTPATIENT
Start: 2019-08-22 | End: 2019-08-23 | Stop reason: HOSPADM

## 2019-08-22 RX ORDER — NYSTATIN 100000 [USP'U]/ML
500000 SUSPENSION ORAL
Status: COMPLETED | OUTPATIENT
Start: 2019-08-22 | End: 2019-08-22

## 2019-08-22 RX ORDER — SODIUM CHLORIDE 0.9 % (FLUSH) 0.9 %
5-40 SYRINGE (ML) INJECTION EVERY 8 HOURS
Status: DISCONTINUED | OUTPATIENT
Start: 2019-08-22 | End: 2019-08-22 | Stop reason: HOSPADM

## 2019-08-22 RX ORDER — ENOXAPARIN SODIUM 100 MG/ML
40 INJECTION SUBCUTANEOUS EVERY 12 HOURS
Status: DISCONTINUED | OUTPATIENT
Start: 2019-08-22 | End: 2019-08-23

## 2019-08-22 RX ORDER — BUPIVACAINE HYDROCHLORIDE AND EPINEPHRINE 5; 5 MG/ML; UG/ML
INJECTION, SOLUTION EPIDURAL; INTRACAUDAL; PERINEURAL AS NEEDED
Status: DISCONTINUED | OUTPATIENT
Start: 2019-08-22 | End: 2019-08-22 | Stop reason: HOSPADM

## 2019-08-22 RX ORDER — FENTANYL CITRATE 50 UG/ML
25 INJECTION, SOLUTION INTRAMUSCULAR; INTRAVENOUS
Status: DISCONTINUED | OUTPATIENT
Start: 2019-08-22 | End: 2019-08-22 | Stop reason: HOSPADM

## 2019-08-22 RX ORDER — ROCURONIUM BROMIDE 10 MG/ML
INJECTION, SOLUTION INTRAVENOUS AS NEEDED
Status: DISCONTINUED | OUTPATIENT
Start: 2019-08-22 | End: 2019-08-22 | Stop reason: HOSPADM

## 2019-08-22 RX ORDER — SODIUM CHLORIDE 0.9 % (FLUSH) 0.9 %
5-40 SYRINGE (ML) INJECTION AS NEEDED
Status: DISCONTINUED | OUTPATIENT
Start: 2019-08-22 | End: 2019-08-22 | Stop reason: HOSPADM

## 2019-08-22 RX ORDER — SODIUM CHLORIDE, SODIUM LACTATE, POTASSIUM CHLORIDE, CALCIUM CHLORIDE 600; 310; 30; 20 MG/100ML; MG/100ML; MG/100ML; MG/100ML
150 INJECTION, SOLUTION INTRAVENOUS CONTINUOUS
Status: DISCONTINUED | OUTPATIENT
Start: 2019-08-22 | End: 2019-08-23 | Stop reason: HOSPADM

## 2019-08-22 RX ORDER — MAGNESIUM SULFATE 100 %
4 CRYSTALS MISCELLANEOUS AS NEEDED
Status: DISCONTINUED | OUTPATIENT
Start: 2019-08-22 | End: 2019-08-22 | Stop reason: HOSPADM

## 2019-08-22 RX ORDER — NEOSTIGMINE METHYLSULFATE 5 MG/5 ML
SYRINGE (ML) INTRAVENOUS AS NEEDED
Status: DISCONTINUED | OUTPATIENT
Start: 2019-08-22 | End: 2019-08-22 | Stop reason: HOSPADM

## 2019-08-22 RX ADMIN — MORPHINE SULFATE 6 MG: 10 INJECTION, SOLUTION INTRAMUSCULAR; INTRAVENOUS at 13:18

## 2019-08-22 RX ADMIN — HYDROMORPHONE HYDROCHLORIDE 1 MG: 2 INJECTION, SOLUTION INTRAMUSCULAR; INTRAVENOUS; SUBCUTANEOUS at 08:05

## 2019-08-22 RX ADMIN — KETOROLAC TROMETHAMINE 30 MG: 30 INJECTION, SOLUTION INTRAMUSCULAR; INTRAVENOUS at 09:40

## 2019-08-22 RX ADMIN — ROCURONIUM BROMIDE 20 MG: 10 INJECTION, SOLUTION INTRAVENOUS at 07:38

## 2019-08-22 RX ADMIN — ROCURONIUM BROMIDE 10 MG: 10 INJECTION, SOLUTION INTRAVENOUS at 07:43

## 2019-08-22 RX ADMIN — ROCURONIUM BROMIDE 10 MG: 10 INJECTION, SOLUTION INTRAVENOUS at 08:46

## 2019-08-22 RX ADMIN — GLYCOPYRROLATE 0.2 MG: 0.2 INJECTION INTRAMUSCULAR; INTRAVENOUS at 07:56

## 2019-08-22 RX ADMIN — MORPHINE SULFATE 6 MG: 10 INJECTION, SOLUTION INTRAMUSCULAR; INTRAVENOUS at 16:57

## 2019-08-22 RX ADMIN — ONDANSETRON 4 MG: 2 INJECTION INTRAMUSCULAR; INTRAVENOUS at 20:39

## 2019-08-22 RX ADMIN — SODIUM CHLORIDE, SODIUM LACTATE, POTASSIUM CHLORIDE, AND CALCIUM CHLORIDE 150 ML/HR: 600; 310; 30; 20 INJECTION, SOLUTION INTRAVENOUS at 12:08

## 2019-08-22 RX ADMIN — KETOROLAC TROMETHAMINE 15 MG: 30 INJECTION, SOLUTION INTRAMUSCULAR at 12:09

## 2019-08-22 RX ADMIN — SODIUM CHLORIDE, SODIUM LACTATE, POTASSIUM CHLORIDE, AND CALCIUM CHLORIDE: 600; 310; 30; 20 INJECTION, SOLUTION INTRAVENOUS at 09:46

## 2019-08-22 RX ADMIN — Medication 4 MG: at 09:45

## 2019-08-22 RX ADMIN — SODIUM CHLORIDE, SODIUM LACTATE, POTASSIUM CHLORIDE, AND CALCIUM CHLORIDE: 600; 310; 30; 20 INJECTION, SOLUTION INTRAVENOUS at 07:53

## 2019-08-22 RX ADMIN — Medication 10 MG: at 07:56

## 2019-08-22 RX ADMIN — Medication 10 MG: at 07:57

## 2019-08-22 RX ADMIN — ENOXAPARIN SODIUM 40 MG: 40 INJECTION SUBCUTANEOUS at 18:44

## 2019-08-22 RX ADMIN — SODIUM CHLORIDE, SODIUM LACTATE, POTASSIUM CHLORIDE, AND CALCIUM CHLORIDE 150 ML/HR: 600; 310; 30; 20 INJECTION, SOLUTION INTRAVENOUS at 16:25

## 2019-08-22 RX ADMIN — ENOXAPARIN SODIUM 40 MG: 40 INJECTION SUBCUTANEOUS at 06:28

## 2019-08-22 RX ADMIN — LIDOCAINE HYDROCHLORIDE 100 MG: 20 INJECTION, SOLUTION EPIDURAL; INFILTRATION; INTRACAUDAL; PERINEURAL at 07:27

## 2019-08-22 RX ADMIN — ACETAMINOPHEN 1000 MG: 10 INJECTION, SOLUTION INTRAVENOUS at 07:31

## 2019-08-22 RX ADMIN — ROCURONIUM BROMIDE 10 MG: 10 INJECTION, SOLUTION INTRAVENOUS at 08:27

## 2019-08-22 RX ADMIN — ONDANSETRON 4 MG: 2 INJECTION INTRAMUSCULAR; INTRAVENOUS at 13:18

## 2019-08-22 RX ADMIN — GLYCOPYRROLATE 0.6 MG: 0.2 INJECTION INTRAMUSCULAR; INTRAVENOUS at 09:45

## 2019-08-22 RX ADMIN — ACETAMINOPHEN 1000 MG: 10 INJECTION, SOLUTION INTRAVENOUS at 12:08

## 2019-08-22 RX ADMIN — SODIUM CHLORIDE, SODIUM LACTATE, POTASSIUM CHLORIDE, AND CALCIUM CHLORIDE: 600; 310; 30; 20 INJECTION, SOLUTION INTRAVENOUS at 08:28

## 2019-08-22 RX ADMIN — ROCURONIUM BROMIDE 10 MG: 10 INJECTION, SOLUTION INTRAVENOUS at 07:54

## 2019-08-22 RX ADMIN — NYSTATIN 500000 UNITS: 100000 SUSPENSION ORAL at 06:28

## 2019-08-22 RX ADMIN — FENTANYL CITRATE 100 MCG: 50 INJECTION, SOLUTION INTRAMUSCULAR; INTRAVENOUS at 07:26

## 2019-08-22 RX ADMIN — SODIUM CHLORIDE, SODIUM LACTATE, POTASSIUM CHLORIDE, AND CALCIUM CHLORIDE 125 ML/HR: 600; 310; 30; 20 INJECTION, SOLUTION INTRAVENOUS at 06:25

## 2019-08-22 RX ADMIN — CEFAZOLIN SODIUM 3 G: 10 INJECTION, POWDER, FOR SOLUTION INTRAVENOUS at 07:32

## 2019-08-22 RX ADMIN — FAMOTIDINE 20 MG: 10 INJECTION, SOLUTION INTRAVENOUS at 06:28

## 2019-08-22 RX ADMIN — MIDAZOLAM HYDROCHLORIDE 2 MG: 1 INJECTION, SOLUTION INTRAMUSCULAR; INTRAVENOUS at 07:22

## 2019-08-22 RX ADMIN — ONDANSETRON 4 MG: 2 INJECTION INTRAMUSCULAR; INTRAVENOUS at 09:36

## 2019-08-22 RX ADMIN — MORPHINE SULFATE 6 MG: 10 INJECTION, SOLUTION INTRAMUSCULAR; INTRAVENOUS at 20:39

## 2019-08-22 RX ADMIN — Medication 10 MG: at 08:49

## 2019-08-22 RX ADMIN — KETOROLAC TROMETHAMINE 15 MG: 30 INJECTION, SOLUTION INTRAMUSCULAR at 18:44

## 2019-08-22 RX ADMIN — ROCURONIUM BROMIDE 50 MG: 10 INJECTION, SOLUTION INTRAVENOUS at 07:27

## 2019-08-22 RX ADMIN — ROCURONIUM BROMIDE 10 MG: 10 INJECTION, SOLUTION INTRAVENOUS at 09:15

## 2019-08-22 RX ADMIN — ACETAMINOPHEN 1000 MG: 10 INJECTION, SOLUTION INTRAVENOUS at 18:43

## 2019-08-22 RX ADMIN — CEFAZOLIN SODIUM 3 G: 10 INJECTION, POWDER, FOR SOLUTION INTRAVENOUS at 15:00

## 2019-08-22 RX ADMIN — PROPOFOL 200 MG: 10 INJECTION, EMULSION INTRAVENOUS at 07:27

## 2019-08-22 NOTE — PERIOP NOTES
Spoke with  and informed him that the patient had 40 mg of Lovenox this past Friday morning 8/16/19. Also informed him that the patient had breakfast yesterday morning around 0730 before starting clear liquids. Per  this is ok.

## 2019-08-22 NOTE — BRIEF OP NOTE
BRIEF OPERATIVE NOTE    Date of Procedure: 8/22/2019   Preoperative Diagnosis: GERD, VOMITING, EPIGASTRIC PAIN, MORBID OBESITY, POST BARIATRIC SURGERY  Postoperative Diagnosis: GERD, VOMITING, EPIGASTRIC PAIN, MORBID OBESITY, POST BARIATRIC SURGERY    Procedure(s):  CONVERSION SLEEVE GASTRECTOMY TO GASTRIC BYPASS  WEDGE LIVER BIOPSY  INTRAOPERATIVE ENDOSCOPY  DIAPHRAGMATIC HERNIA REPAIR  LYSIS OF ADHESIONS  PARTIAL GASTRECTOMY  Surgeon(s) and Role:     * Melvin Dailey MD - Primary         Surgical Assistant: henny    Surgical Staff:  Circ-1: Obdulia Abdullahi RN  Circ-2: Tracey Bowser RN  Circ-Relief: Wilbert Bejarano RN  Physician Assistant: VIDHI Valdez  Scrub Tech-1: Haylie Souza Tech-2: Chandler Boxer, [place]  Event Time In Time Out   Incision Start 7113    Incision Close 6133      Anesthesia: General   Estimated Blood Loss: less 20 cc  Specimens:   ID Type Source Tests Collected by Time Destination   1 : Partial Gastrectomy Preservative Gastric  Melvin Dailey MD 8/22/2019 3457 Pathology   2 : Gastric Cardia Biopsy Preservative Stomach  Melvin Dailey MD 8/22/2019 9608 Pathology   3 : LIVER BIOPSY Preservative   Melvin Dailey MD 8/22/2019 1250 Pathology      Findings: see dictation    Complications: see dictation  Implants: * No implants in log *

## 2019-08-22 NOTE — PROGRESS NOTES
Problem: Falls - Risk of  Goal: *Absence of Falls  Description  Document Jabari Tillman Fall Risk and appropriate interventions in the flowsheet.   Outcome: Progressing Towards Goal  Note:   Fall Risk Interventions:            Medication Interventions: Patient to call before getting OOB                   Problem: Patient Education: Go to Patient Education Activity  Goal: Patient/Family Education  Outcome: Progressing Towards Goal     Problem: Pain  Goal: *Control of Pain  Outcome: Progressing Towards Goal     Problem: Patient Education: Go to Patient Education Activity  Goal: Patient/Family Education  Outcome: Progressing Towards Goal     Problem: Patient Education: Go to Patient Education Activity  Goal: Patient/Family Education  Outcome: Progressing Towards Goal     Problem: Surgical Pathway Day of Surgery  Goal: Off Pathway (Use only if patient is Off Pathway)  Outcome: Progressing Towards Goal  Goal: Nutrition/Diet  Outcome: Progressing Towards Goal  Goal: Treatments/Interventions/Procedures  Outcome: Progressing Towards Goal  Goal: Psychosocial  Outcome: Progressing Towards Goal

## 2019-08-22 NOTE — ANESTHESIA POSTPROCEDURE EVALUATION
Procedure(s):  LAPAROSCOPIC GASTRIC BYPASS WITH LIVER BIOPSY AND INTRAOPERATIVE ENDOSCOPY, DIAPHRAGMATIC HERNIA REPAIR, LYSIS OF ADHESIONS, PARTIAL GASTRECTOMY, CONVERSION SLEEVE TO GASTRIC BYPASS. general    Anesthesia Post Evaluation      Multimodal analgesia: multimodal analgesia used between 6 hours prior to anesthesia start to PACU discharge  Patient location during evaluation: PACU  Patient participation: complete - patient participated  Level of consciousness: awake  Pain management: adequate  Airway patency: patent  Anesthetic complications: no  Cardiovascular status: acceptable  Respiratory status: acceptable  Hydration status: acceptable  Post anesthesia nausea and vomiting:  none      Vitals Value Taken Time   /65 8/22/2019 10:30 AM   Temp 36.3 °C (97.3 °F) 8/22/2019 10:00 AM   Pulse 56 8/22/2019 10:33 AM   Resp 19 8/22/2019 10:33 AM   SpO2 98 % 8/22/2019 10:33 AM   Vitals shown include unvalidated device data.

## 2019-08-22 NOTE — PERIOP NOTES
TRANSFER - OUT REPORT:    Verbal report given to MIRTA Torres(name) on [employer]  being transferred to (unit) for routine post - op       Report consisted of patients Situation, Background, Assessment and   Recommendations(SBAR). Information from the following report(s) SBAR, Kardex, OR Summary, Procedure Summary, Intake/Output and Accordion was reviewed with the receiving nurse. Lines:   Peripheral IV 08/22/19 Right; Inner Forearm (Active)   Site Assessment Clean, dry, & intact 8/22/2019 10:06 AM   Phlebitis Assessment 0 8/22/2019 10:06 AM   Infiltration Assessment 0 8/22/2019 10:06 AM   Dressing Status Clean, dry, & intact 8/22/2019 10:06 AM   Dressing Type Transparent;Tape 8/22/2019 10:06 AM   Hub Color/Line Status Green; Infusing 8/22/2019 10:06 AM   Alcohol Cap Used No 8/22/2019  6:27 AM        Opportunity for questions and clarification was provided.       Patient transported with:   Registered Nurse  Tech

## 2019-08-22 NOTE — NURSE NAVIGATOR
Patients wife at bedside. Patient dozing in and out of sleep throughout visit. Patient complained of expected pain with mild nausea. Vitals:   Visit Vitals  /72 (BP 1 Location: Left arm)   Pulse 69   Temp 98.4 °F (36.9 °C)   Resp 17   Ht 5' 11\" (1.803 m)   Wt 130 kg (286 lb 9 oz)   SpO2 98%   BMI 39.97 kg/m²     General: Alert & oriented to person, place, time, and situation  Abdomen: Appropriate tenderness; soft; non-distended  Lap sites: Within normal limits  CLARISA drain: Small amount of serosanguinous drainage  Singh catheter: 25 cc clear, yellow urine  SCD's: In place and operating WNL    Plan:  -Continue medications for symptom management  -Encourage ambulation  -SCD use when in bed  -IS 10 times an hour  -NPO  -UGI in AM; bariatric full liquid diet  if UGI within normal limits  -Singh removed in AM    Plan was discussed with patient and his wife, as well as IS teaching provided. Both verbalized understanding to plan and education.

## 2019-08-22 NOTE — PROGRESS NOTES
Transition of Care (KEILA) Plan:    Physician follow up     Chart reviewed. Pt admitted for an elective surgical procedure (LAPAROSCOPIC GASTRIC BYPASS WITH LIVER BIOPSY AND INTRAOPERATIVE ENDOSCOPY, DIAPHRAGMATIC HERNIA REPAIR, LYSIS OF ADHESIONS, PARTIAL GASTRECTOMY, CONVERSION SLEEVE TO GASTRIC BYPASS). Pt is independent. Please encourage ambulation. No transition of care needs identified at this time. Anticipate pt will be medically stable for discharge within the next 24-48 hours with physician follow up. CM available to assist as needed. KEILA Transportation:   How is patient being transported at discharge? Family/Friend      When? Once cleared by physician     Is transport scheduled? N/A      Follow-up appointment and transportation:   PCP/Specialist?  See AVS for Appointment         Who is transporting to the follow-up appointment? Self/Family/Friend      Is transport for follow up appointment scheduled? N/A    Communication plan (with patient/family): Who is being called? Patient or Next of Kin? Responsible party? Patient      What number(s) is to be used? See Facesheet      What service provider is calling for [facility] services? When are they calling? Readmission Risk? (Green/Low; Yellow/Moderate; Red/High):  Green      Care Management Interventions  PCP Verified by CM: Yes  Mode of Transport at Discharge:  Other (see comment)(Friends)  Transition of Care Consult (CM Consult): Discharge Planning  Health Maintenance Reviewed: Yes  Current Support Network: Family Lives Nearby  Confirm Follow Up Transport: Self  Discharge Location  Discharge Placement: Home with family assistance

## 2019-08-22 NOTE — PERIOP NOTES
Reviewed PTA medication list with patient/caregiver and patient/caregiver denies any additional medications. Patient admits to having a responsible adult care for them at home for at least 24 hours after surgery. Patient denies rc chewing/swallowing difficulties. Patient encouraged to use Lisa paws warming system and informed that using Lisa paws to regulate body temperature prior to a procedure has been shown to help reduce the risks of blood clots and infection. Dual skin assessment & fall risk band verification completed with Chidi Goldstein RN. Patient denies having had any type of steroid injection in the last 30 days and has compression stockings in place to BLE.

## 2019-08-22 NOTE — INTERVAL H&P NOTE
H&P Update: Pam Martinez was seen and examined. History and physical has been reviewed. The patient has been examined.  There have been no significant clinical changes since the completion of the originally dated History and Physical.

## 2019-08-22 NOTE — PROGRESS NOTES
1034  TRANSFER - IN REPORT:    Verbal report received from ANAHI Llanos RN(name) on [employer]  being received from [facility]) for routine progression of care      Report consisted of patients Situation, Background, Assessment and   Recommendations(SBAR). Information from the following report(s) SBAR was reviewed with the receiving nurse. Opportunity for questions and clarification was provided. Assessment completed upon patients arrival to unit and care assumed.

## 2019-08-22 NOTE — ANESTHESIA PREPROCEDURE EVALUATION
Relevant Problems   No relevant active problems       Anesthetic History   No history of anesthetic complications            Review of Systems / Medical History  Patient summary reviewed, nursing notes reviewed and pertinent labs reviewed    Pulmonary            Asthma : well controlled       Neuro/Psych   Within defined limits           Cardiovascular                  Exercise tolerance: >4 METS     GI/Hepatic/Renal     GERD: well controlled           Endo/Other        Morbid obesity     Other Findings              Physical Exam    Airway  Mallampati: III  TM Distance: 4 - 6 cm  Neck ROM: normal range of motion   Mouth opening: Normal     Cardiovascular  Regular rate and rhythm,  S1 and S2 normal,  no murmur, click, rub, or gallop             Dental  No notable dental hx       Pulmonary  Breath sounds clear to auscultation               Abdominal  GI exam deferred       Other Findings            Anesthetic Plan    ASA: 3  Anesthesia type: general          Induction: Intravenous  Anesthetic plan and risks discussed with: Patient

## 2019-08-23 ENCOUNTER — APPOINTMENT (OUTPATIENT)
Dept: GENERAL RADIOLOGY | Age: 34
DRG: 220 | End: 2019-08-23
Attending: SPECIALIST
Payer: MEDICAID

## 2019-08-23 VITALS
HEART RATE: 58 BPM | TEMPERATURE: 97.9 F | DIASTOLIC BLOOD PRESSURE: 62 MMHG | BODY MASS INDEX: 40.12 KG/M2 | WEIGHT: 286.56 LBS | HEIGHT: 71 IN | RESPIRATION RATE: 16 BRPM | OXYGEN SATURATION: 97 % | SYSTOLIC BLOOD PRESSURE: 104 MMHG

## 2019-08-23 LAB
ERYTHROCYTE [DISTWIDTH] IN BLOOD BY AUTOMATED COUNT: 13.5 % (ref 11.6–14.5)
HCT VFR BLD AUTO: 38.9 % (ref 36–48)
HGB BLD-MCNC: 12.8 G/DL (ref 13–16)
MCH RBC QN AUTO: 27.8 PG (ref 24–34)
MCHC RBC AUTO-ENTMCNC: 32.9 G/DL (ref 31–37)
MCV RBC AUTO: 84.4 FL (ref 74–97)
PLATELET # BLD AUTO: 216 K/UL (ref 135–420)
PMV BLD AUTO: 9.7 FL (ref 9.2–11.8)
RBC # BLD AUTO: 4.61 M/UL (ref 4.7–5.5)
WBC # BLD AUTO: 7.9 K/UL (ref 4.6–13.2)

## 2019-08-23 PROCEDURE — 74240 X-RAY XM UPR GI TRC 1CNTRST: CPT

## 2019-08-23 PROCEDURE — 74011250637 HC RX REV CODE- 250/637: Performed by: PHYSICIAN ASSISTANT

## 2019-08-23 PROCEDURE — 74011636320 HC RX REV CODE- 636/320: Performed by: SPECIALIST

## 2019-08-23 PROCEDURE — 77010033678 HC OXYGEN DAILY

## 2019-08-23 PROCEDURE — 85027 COMPLETE CBC AUTOMATED: CPT

## 2019-08-23 PROCEDURE — 36415 COLL VENOUS BLD VENIPUNCTURE: CPT

## 2019-08-23 PROCEDURE — C9113 INJ PANTOPRAZOLE SODIUM, VIA: HCPCS | Performed by: SPECIALIST

## 2019-08-23 PROCEDURE — 74011250636 HC RX REV CODE- 250/636: Performed by: SPECIALIST

## 2019-08-23 RX ORDER — OXYCODONE AND ACETAMINOPHEN 5; 325 MG/1; MG/1
1-2 TABLET ORAL
Status: DISCONTINUED | OUTPATIENT
Start: 2019-08-23 | End: 2019-08-23 | Stop reason: HOSPADM

## 2019-08-23 RX ADMIN — KETOROLAC TROMETHAMINE 15 MG: 30 INJECTION, SOLUTION INTRAMUSCULAR at 02:51

## 2019-08-23 RX ADMIN — MORPHINE SULFATE 6 MG: 10 INJECTION, SOLUTION INTRAMUSCULAR; INTRAVENOUS at 02:51

## 2019-08-23 RX ADMIN — PANTOPRAZOLE SODIUM 40 MG: 40 INJECTION, POWDER, LYOPHILIZED, FOR SOLUTION INTRAVENOUS at 09:21

## 2019-08-23 RX ADMIN — CEFAZOLIN SODIUM 3 G: 10 INJECTION, POWDER, FOR SOLUTION INTRAVENOUS at 02:51

## 2019-08-23 RX ADMIN — OXYCODONE HYDROCHLORIDE AND ACETAMINOPHEN 2 TABLET: 5; 325 TABLET ORAL at 09:21

## 2019-08-23 RX ADMIN — IOHEXOL 75 ML: 240 INJECTION, SOLUTION INTRATHECAL; INTRAVASCULAR; INTRAVENOUS; ORAL at 09:33

## 2019-08-23 RX ADMIN — ACETAMINOPHEN 1000 MG: 10 INJECTION, SOLUTION INTRAVENOUS at 02:51

## 2019-08-23 RX ADMIN — ONDANSETRON 4 MG: 2 INJECTION INTRAMUSCULAR; INTRAVENOUS at 02:51

## 2019-08-23 NOTE — OP NOTES
[facility]  OPERATIVE REPORT    Name:  Amalia Edwards  MR#:   [id number]  :  1985  ACCOUNT #:  [de-identified]  DATE OF SERVICE:  2019    PREOPERATIVE DIAGNOSES:  1. Severe gastroesophageal reflux disease status post laparoscopic sleeve gastrectomy. 2.  Persistent morbid obesity. POSTOPERATIVE DIAGNOSES:  1. Severe gastroesophageal reflux disease status post laparoscopic sleeve gastrectomy with extensive intraabdominal adhesions due to prior surgery. 2.  Diaphragmatic hernia. 3.  Severe chronic infection of liver. 4.  Persistent morbid obesity. PROCEDURE PERFORMED:  1. Extensive laparoscopic lysis of adhesions of greater than 30 minutes duration. 2.  Conversion of laparoscopic sleeve gastrectomy to laparoscopic gastric bypass procedure with a 150 cm Mendoza limb bypass in an antecolic, antegastric fashion. 4.  Diaphragmatic hernia repair. 5.  Partial gastrectomy. 6.  Wedge liver biopsy. 7.  Endoscopy with biopsy. SURGEON:  Deondre Ceja MD    ASSISTANT:  VIDHI Santos.  Suzan MOSHER assisted with the procedure since there were no qualified surgeons, interns, or residents available. He assisted with adhesiolysis, exposure during the procedure, conversion of the sleeve gastrectomy to gastric bypass, partial gastrectomy, wedge liver biopsy, and skin and fascial closures. ANESTHESIA:  General endotracheal.    COMPLICATIONS:  None. SPECIMENS REMOVED:  1.  Wedge liver biopsy specimen. 2.  Partial gastrectomy resection specimen. 3.  Endoscopy with biopsy specimen. IMPLANTS:  None. ESTIMATED BLOOD LOSS:  Less than 10 mL. STATEMENT OF MEDICAL NECESSITY:  This is a patient of mine who several years ago underwent a laparoscopic sleeve gastrectomy for diagnosis of super morbid obesity. He was doing great with his weight loss, achieved very significant weight loss overall, then had an abdominoplasty for the excess skin which he experienced after surgery. Almost immediately after he underwent this abdominoplasty, he began to experience extreme reflux disease. Reflux disease was quite severe. He was started on PPI and this did not improve his reflux symptoms and he began to have some mild weight regain over time which even worsened with symptoms further. He presented to me finally in consultation for these poorly symptoms and on maximal PPI therapy, he underwent an upper GI study which showed significant reflux episodes present. His abdominal wall was exceptionally tight and I understand that the intraabdominal pressures were high due to his abdominoplasty, and this was making his reflux symptoms worse and recur from his preoperative state. I talked with him about the conversion procedure. He underwent preoperative weight loss program and ultimately psychological assessment and he was deemed a good candidate for conversion procedure. I did understand that the best operation for this would be conversion to the gastric bypass. He understood the additional risks of the operation as it pertains to being a conversion operation, he did wish to undergo the conversion today for the benefit of both additional weight loss and reflux control. PROCEDURE:  The patient was brought to the operating room, placed on the table in the supine position at which time general anesthesia was administered without any difficulty. His abdomen was then prepped and draped in usual sterile fashion. Using a 15 blade, a 1 cm incision was made inferior to the umbilicus just above the midline. Veress needle approach was used to gain access to the peritoneal cavity which was then insufflated. Jinnie Fatemeh was then placed through that site and with insufflation maximally, he had very little distention of his abdominal wall due to his abdominoplasty.   I then placed the remainder of the trocars in the usual fashion and we were just barely able to visualize his intraabdominal cavity, I understand that we might be able to covert into the gastric bypass. I started the operation by doing the lysis of adhesions on the undersurface of the liver. I freed up the entire left subhepatic space and the sleeve all the way to the level of diaphragm both anteriorly and laterally. I then placed calibration tube into the sleeve proximally and I inflated the balloon with 20 mL of saline solution and pulled it back towards the gastroesophageal junction. I then dissected along the lesser curvature of the stomach until I entered the retrogastric space. From the gastric portion of the operation, I knew I would be able to convert him. Therefore, I went to the small intestine portion of the operation where I located the ligament of Treitz. I then measured an area approximately 40 cm distal to the ligament of Treitz. I transected the small bowel using the Endo WILLY stapler with white reloads. I then cut the mesentery of the small bowel using 2 firings of the Endo WILLY stapler and this allowed for excellent mobilization to the upper abdomen. I then measured off a 150 cm Mendoza limb bypass. I placed it in a side-to-side fashion with afferent limb, placing stay sutures in two limbs of the bowel. I then created enterotomies in the two limbs of the bowel. I placed the stapling device intraluminally. I then closed it and fired it creating the linear anastomosis. I then assessed that that was hemostatic, it was, and I then closed the free margin of the enterotomy using 2-0 Ethibond suture. I then used the stapling device to facilitate closure. Once this was achieved, I then closed the mesenteric defect at that site using running 2-0 Ethibond suture. Once this was achieved, I then brought the Mendoza limb over the transverse colon and I divided them in the midline and it reached nicely to the level of the diaphragm and no tension at all. I then at this juncture had removed the calibration tube.   I created a lateral gastrotomy in the distal sleeve and I placed the cap of a 21 ILS stapler transabdominally. I got it through the gastrotomy out through the anterior gastric pouch in the area I had marked previously for the new anastomosis. Once I retrieved the cap, I then placed a pursestring suture at the base and tied it securely. I then created the pouch using the Brock stapler with green reloads. I used 2 firings to transect the pouch just below the cath and then I used additional firing to perform resection of lateral wall of the proximal sleeve to narrow it down and submitted it to Pathology for permanent section. I then brought the Mendoza limb in antecolic, antegastric fashion. All areas were hemostatic. I then opened the free end using Harmonic scalpel. I guided the circular stapling device transabdominally through the left lower quadrant incision. I guided the circular stapler through the Mendoza limb which I created a lateral enterotomy in and I dug this through the enterotomy deploying the sphere through the antimesenteric border of the bowel. It was then attached to the cap, closed, and fired creating circular anastomosis. With two very good tissue rings noted within the stapling device, free margin of the Mendoza limb was then trimmed free using Endo WILLY stapler with white reloads. I then  oversewed the anastomosis using 2-0 Ethibond suture. I then tested the pouch using air insufflation with the endoscope. I used the endoscope transorally. I entered the gastric pouch. It was not bleeding, totally viable. Insufflation revealed no leak. I did biopsy the lateral wall of the pouch and submitted for H. pylori assessment. At this juncture, I went back towards the abdominal portion of the operation where all areas were checked for hemostasis. I then placed Evicel along all staples lines and then Surgicel SnoW along all the staple lines.   With all areas being hemostatic, I then closed the diaphragmatic hernia which I had noted immediately on entering the abdomen. This was closed using 2-0 Ethibond suture using two separate sutures in the process. Once this was all achieved, I then removed the liver retractor, and due to massive enlargement of the liver and nodularity throughout, I did biopsy the left lobe of the liver and I submitted to Pathology for permanent section. All areas of wound and skin were hemostasis. CLARISA drain was placed in the upper abdomen and all trocars were then removed under direct visualization closing left lower quadrant trocar site using a transabdominal 0 PDS suture along the fascia. All skin incisions were then closed using 4-0 subcuticular Monocryl. Steri-Strips and sterile dressings were applied. The patient tolerated the procedure well.       Farhan Pierre MD      [id number]  D:  08/22/2019 19:54  T:  08/23/2019 1:52  JOB #:  [id number]

## 2019-08-23 NOTE — PROGRESS NOTES
1937  Bedside and Verbal shift change report given to AndreRN by Justine Akbar RN. Report included the following information SBAR, Kardex, OR Summary, Intake/Output and MAR.

## 2019-08-23 NOTE — NURSE NAVIGATOR
Patient's wife at bedside. Patient sitting on side of bed sipping broth and tolerating well. Vitals:   Blood pressure 109/57, pulse (!) 58, temperature 98.3 °F (36.8 °C), resp. rate 16, height 5' 11\" (1.803 m), weight 130 kg (286 lb 9 oz), SpO2 99 %. Output: reviewed, and patient verified urinating clear, yellow urine. Abdomen:  Lap sites without erythema, swelling, and/or drainage. CLARISA drain with a moderate amount of serosanguinous drainage. Dr. Maritza Du in with this RN to assess. SCD's: Positioned and operating WNL    Patient with expected pain, but is being managed and is currently tolerable. No nausea and/or vomiting. Patient has been ambulating the halls. Patient is able to swallow pills. Post-op diet progression discussed with patient. Patient to be discharged on a bariatric full liquid diet. Patient verbalized understanding of liquid diet for next two weeks until first post-op visit. Goal of four ounces per hour with one ounce being protein was clearly understood. Medications were discussed (i.e., pain- Percocet, Tylenol, not to use aspirin or ibuprofen based products, as well as steroids). Constipation was discussed and ways to alleviate it were discussed. Education completed on IS use and to ambulate every hour when at home. Patient completed a return demonstration on IS with no issues or concerns from this RN. All scripts given during pre-op visit were filled and in the home. Lovenox education provided, and patient will be administering herself. Ketosis was also reviewed. Patient given a report card to record intake and a handout to support bedside education. All questions were answered by this RN, and patient verbalized understanding to all education provided. Goals for discharge were discussed, and patient verbalized understanding. Post-op follow-up appt. aready scheduled.

## 2019-08-23 NOTE — DISCHARGE INSTRUCTIONS
[facility]  Negra Ferguson M.D., .A[address], [address] [facility]  Office: [phone]    Fax:  [phone]    Discharge Instruction for Gastric Bypass / Sleeve Gastrectomy Patients    Diet:   Continue with the liquid diet until you are seen in the office. Make sure you sip fluids all day. Goal for total fluid intake is at least 64 ounces per day. Aim for  Gms of protein every day. Activity:   Walking is encouraged. Rest when you are tired.  You may shower.  You may climb stairs. Take your time.  No lifting more than 10-15 lbs.  If you feel discomfort during an activity, rest.   Do not drive for 1 week or until off of all narcotics. Wound Care:   Clean incisions with soap and water when in the shower. Pat dry.  Leave steri-strips on until they fall off.  A small amount of drainage may be present from the incisions. Contact the office if you notice an increase in drainage, an odor, increased redness, or fever > 100.5. Medications:   You will receive a prescription for pain medication and Prilosec at the time of discharge.  For upset stomach you may take over the counter medications such as Maalox, Mylanta, or Pepto Bismol.  Gas X works very well for bloating when eating or drinking   You may take Tylenol   Non-aspirin based arthritis medications may be resumed    Take a childrens or adult chewable multivitamin.  Milk of Magnesia will help with constipation.  It is fine to take your usual home medications. Blood pressure medications should be continued after surgery. Diabetic medications can frequently be reduced very quickly after surgery. Diabetics should continue to monitor blood sugars frequently after surgery and contact the prescribing physician for any questions.     Follow-Up Appointment:   If you do not already have a follow-up appointment scheduled, contact the office in the next few days to obtain one. It is usually scheduled for 10-14 days after you surgery date. Office phone number:  [phone].         REV  06/2017

## 2019-08-23 NOTE — DISCHARGE SUMMARY
Discharge Summary    Patient: Ayush               Sex: male          DOA: 8/22/2019         YOB: 1985      Age:  28 y.o.        LOS:  LOS: 1 day                Admit Date: 8/22/2019    Discharge Date: 8/23/2019    Admission Diagnoses: GERD (gastroesophageal reflux disease) [K21.9]    Discharge Diagnoses:    Problem List as of 8/23/2019 Date Reviewed: 8/22/2019          Codes Class Noted - Resolved    Hypovitaminosis D ICD-10-CM: E55.9  ICD-9-CM: 268.9  7/27/2017 - Present    Overview Signed 7/27/2017  8:29 AM by Emilie Landeros     2/20/17 - Vit D 18.5             S/P bariatric surgery ICD-10-CM: Z98.84  ICD-9-CM: V45.86  Unknown - Present        Intestinal malabsorption ICD-10-CM: K90.9  ICD-9-CM: 579.9  Unknown - Present        Morbid obesity with BMI of 60.0-69.9, [facility]) ICD-10-CM: E66.01, Z68.44  ICD-9-CM: 278.01, V85.44  3/15/2016 - Present        Morbid obesity with body mass index of 60.0-69.9 in [facility]) ICD-10-CM: E66.01, Z68.44  ICD-9-CM: 278.01, V85.44  Unknown - Present        Back pain ICD-10-CM: M54.9  ICD-9-CM: 724.5  Unknown - Present        Smoking history ICD-10-CM: Z87.891  ICD-9-CM: V15.82  Unknown - Present    Overview Signed 8/31/2015 11:31 AM by VIDHI Mercedes 2013             Strabismus ICD-10-CM: H50.9  ICD-9-CM: 378.9  Unknown - Present        * (Principal) GERD (gastroesophageal reflux disease) ICD-10-CM: K21.9  ICD-9-CM: 530.81  Unknown - Present              Discharge Condition: Good    Hospital Course: The patient underwent conversion of prior sleeve resection to laparoscopic gastric bypass surgery  on 8/22/2019. The patient tolerated the procedure well. Vital signs remained stable and the patient was transferred to  3rd floor surgical unit without complications. The patient remained stable throughout the first night post operatively with stable vital signs and adequate urine output and pain control.  Pain was controlled with Dilaudid IV and IV Tylenol . The patient on the first morning post operative was transferred to the radiology suite where they underwent a gastrograffin UGI study which showed no evidence of a leak or stricture. The drain was discontinued on POD # 1 and the patient was started on a bariatric liquid diet with protein shakes. The patient progressed throughout the day and was ambulating well and tolerating their diet. They were therefore discharged home with instructions to notify me with any issues that may arise. Significant Diagnostic Studies:   Recent Labs     08/23/19  0740   HGB 12.8*     Recent Labs     08/23/19  0740   HCT 38.9       Current Discharge Medication List      CONTINUE these medications which have NOT CHANGED    Details   omeprazole (PRILOSEC) 40 mg capsule Take 1 Cap by mouth daily. Indications: gastroesophageal reflux disease  Qty: 30 Cap, Refills: 3      enoxaparin (LOVENOX) 40 mg/0.4 mL 0.4 mL by SubCUTAneous route every twelve (12) hours every twelve (12) hours. Qty: 14 Syringe, Refills: 0      cyanocobalamin (VITAMIN B12) 1,000 mcg/mL injection 1 mL by SubCUTAneous route every thirty (30) days. Indications: Prevention of Vitamin B12 Deficiency  Qty: 3 mL, Refills: 4      Syringe with Needle, Disp, 3 mL 25 x 5/8\" syrg 1 mL by SubCUTAneous route every thirty (30) days. Qty: 15 Syringe, Refills: 0      multivitamin (ONE A DAY) tablet Take 1 Tab by mouth daily. STOP taking these medications       ferrous sulfate (IRON PO) Comments:   Reason for Stopping:         ergocalciferol (ERGOCALCIFEROL) 50,000 unit capsule Comments:   Reason for Stopping:               Activity: activity as tolerated with no heavy lifting of greater than 20 pounds. No anti- inflammatory medications. Use stool softeners at home as needed while taking pain medications since they are constipating.     Diet: Bariatric liquid diet    Wound Care: Keep wound clean and dry, Reinforce dressing PRN and ice to area for comfort. Do not get wound wet for 2 days.     Follow-up: 14 days with Dr Nivia Doran M.D

## 2019-08-23 NOTE — PROGRESS NOTES
Bariatric Surgery                POD #1    Visit Vitals  /57 (BP 1 Location: Left arm, BP Patient Position: At rest)   Pulse (!) 58   Temp 98.3 °F (36.8 °C)   Resp 16   Ht 5' 11\" (1.803 m)   Wt 130 kg (286 lb 9 oz)   SpO2 99%   BMI 39.97 kg/m²     Patient has minimal complaints of pain, minimal nausea noted     Exam:  Appears well in no distress  Lungs- clear bilaterally  Abd - soft, incisions look good without erythema           CLARISA with some bloody output  Extremities- no new edema or swelling    UGI - pending    Data Review:    Labs: Results:       Chemistry No results for input(s): GLU, NA, K, CL, CO2, BUN, CREA, CA, AGAP, BUCR, TBIL, GPT, AP, TP, ALB, GLOB, AGRAT in the last 72 hours. CBC w/Diff No results for input(s): WBC, RBC, HGB, HCT, PLT, GRANS, LYMPH, EOS, RETIC, HGBEXT, HCTEXT, PLTEXT in the last 72 hours. Coagulation No results for input(s): PTP, INR, APTT in the last 72 hours. No lab exists for component: INREXT    Liver Enzymes No results for input(s): TP, ALB, TBIL, AP, SGOT, GPT in the last 72 hours. No lab exists for component: DBIL       Assessment/Plan: S/P conversion of sleeve resection to laparoscopic gastric bypass surgery - no signs of hemodynamic compromise despite increase in drain output. STAT CBC ordered at note time  Hold lovenox and toradol    Following orders once UGI confirmed normal -     1. Start bariatric diet and protein shakes  2. D/C IV pain meds and start PO pain meds  3. Likley PM D/C if  Cont ok and tolerate PO

## 2019-08-23 NOTE — PROGRESS NOTES
Noted pt has been ambulating independently in the halls with family. No transition of care needs have been identified. Care Management Interventions  PCP Verified by CM: Yes  Mode of Transport at Discharge:  Other (see comment)(Friends)  Transition of Care Consult (CM Consult): Discharge Planning  Health Maintenance Reviewed: Yes  Current Support Network: Family Lives Nearby  Confirm Follow Up Transport: Self  Discharge Location  Discharge Placement: Home with family assistance

## 2019-08-23 NOTE — ROUTINE PROCESS
Bedside and Verbal shift change report given to Jeannine Guerin RN (oncoming nurse) by Drew Martinez RN (offgoing nurse). Report included the following information SBAR, Kardex, OR Summary, Procedure Summary, Intake/Output, MAR, Recent Results and Med Rec Status.

## 2019-08-26 ENCOUNTER — TELEPHONE (OUTPATIENT)
Dept: SURGERY | Age: 34
End: 2019-08-26

## 2019-08-26 NOTE — TELEPHONE ENCOUNTER
This RN spoke with patient post-operatively. Sipping: Patient is up to sipping mid 40 ounces. Nausea and/or vomitting: None    Pain: Currently managed with Percocet and/or Tylenol    Lovenox injections: Administering every 12 hours, rotating sites. RN reminded patient to complete all injections, in which patient verbalized understanding. Lap sites: No erythema, drainage, and/or swelling    CLARISA drain site: No drainage    BM: None to date, but is passing flatus. Ambulation: Patient is walking throughout house every hour. IS: Patient continues to use 10x's per hour while awake. Temperature: 98.7 degrees    Pulse: 60 bpm     Medications: (confirmed currently taking)   *Multi-vitamin: yes   *Probiotic: has not purchased; will do so   *Prilosec: yes   *Lovenox: yes    Questions: none    This RN reminded the patient to contact the office with any questions and/or concerns. RN also reminded patient they will receive another follow-up TC prior to the two week post-op follow-up appointment. Patient verbalized understanding to both. Patient's two week post-op visit is scheduled and was confirmed.

## 2019-09-03 ENCOUNTER — OFFICE VISIT (OUTPATIENT)
Dept: SURGERY | Age: 34
End: 2019-09-03

## 2019-09-03 VITALS
DIASTOLIC BLOOD PRESSURE: 67 MMHG | WEIGHT: 277.7 LBS | BODY MASS INDEX: 38.88 KG/M2 | SYSTOLIC BLOOD PRESSURE: 124 MMHG | HEIGHT: 71 IN | HEART RATE: 63 BPM | OXYGEN SATURATION: 100 % | TEMPERATURE: 97.6 F

## 2019-09-03 DIAGNOSIS — K90.9 INTESTINAL MALABSORPTION, UNSPECIFIED TYPE: Primary | ICD-10-CM

## 2019-09-03 DIAGNOSIS — Z98.84 S/P BARIATRIC SURGERY: ICD-10-CM

## 2019-09-03 RX ORDER — URSODIOL 500 MG/1
500 TABLET, FILM COATED ORAL DAILY
Qty: 30 TAB | Refills: 4 | Status: SHIPPED | OUTPATIENT
Start: 2019-09-03 | End: 2019-10-03

## 2019-09-03 RX ORDER — ERGOCALCIFEROL 1.25 MG/1
50000 CAPSULE ORAL
COMMUNITY

## 2019-09-03 NOTE — LETTER
9/3/2019 1:16 PM 
 
Mr. Lluvia Stack [address] [address] To whom it may concern; Lluvia Stack has been under the care of [facility] since 8/22/19. He has clearance to return to work on 9/4/19 without restrictions. Patient must have access to fluids at all times as he is at risk of dehydration. Please feel free to contact our office with any questions or concerns.  
 
 
Sincerely, 
 
 
 
 
VIDHI Pak

## 2019-09-03 NOTE — PROGRESS NOTES
Subjective: Kahlil Lin is a 28 y.o. male is now 2 weeks status post laparoscopic conversion of sleeve gastrectomy to gastric bypass surgery. Doing well overall. He has lost a total of 9 pounds since surgery. Body mass index is 38.73 kg/m². Currently on a liquid diet without difficulty. Taking in 50oz water daily. Sources of protein include protein shakes. 20 min of activity 3 days a week, including walking. Patient is sleeping 8-9 hours a night on average. Bowel movements are regular. The patient is not having any pain. . The patient is compliant with multivitamins. Surgery related complications: none. Liver bx report reviewed with patient. Stomach bx report reviewed with patient.     Weight Loss Metrics 9/3/2019 8/22/2019 8/16/2019 8/8/2019 8/5/2019 7/16/2019 7/3/2019   Today's Wt 277 lb 11.2 oz 286 lb 9 oz 282 lb 6 oz 280 lb 285 lb 12.8 oz 284 lb 284 lb 4 oz   BMI 38.73 kg/m2 39.97 kg/m2 39.38 kg/m2 39.05 kg/m2 39.86 kg/m2 39.61 kg/m2 39.64 kg/m2          Comorbidities:    Hypertension: not applicable  Diabetes: not applicable  Obstructive Sleep Apnea: not applicable  Hyperlipidemia: not applicable  Stress Urinary Incontinence: not applicable  Gastroesophageal Reflux: improved, on PPI  Weight related arthropathy:not applicable     Patient Active Problem List   Diagnosis Code    Back pain M54.9    Smoking history Z87.891    Strabismus H50.9    GERD (gastroesophageal reflux disease) K21.9    S/P bariatric surgery Z98.84    Intestinal malabsorption K90.9    Hypovitaminosis D E55.9        Past Medical History:   Diagnosis Date    Asthma     childhood    Back pain     GERD (gastroesophageal reflux disease)     Intestinal malabsorption     S/P bariatric surgery     Smoking history     quit 2013    Strabismus        Past Surgical History:   Procedure Laterality Date    HX ABDOMINOPLASTY      2/2018    HX GI  03/15/2016    gastric sleeve    HX WISDOM TEETH EXTRACTION Current Outpatient Medications   Medication Sig Dispense Refill    multivitamin with iron (FLINTSTONES) chewable tablet Take 2 Tabs by mouth daily.  ergocalciferol (VITAMIN D2) 50,000 unit capsule Take 50,000 Units by mouth.  ursodiol (JOSE LUIS FORTE) 500 mg tablet Take 1 Tab by mouth daily for 30 days. 30 Tab 4    cyanocobalamin (VITAMIN B12) 1,000 mcg/mL injection 1 mL by SubCUTAneous route every thirty (30) days. Indications: Prevention of Vitamin B12 Deficiency 3 mL 4    Syringe with Needle, Disp, 3 mL 25 x 5/8\" syrg 1 mL by SubCUTAneous route every thirty (30) days. 15 Syringe 0    omeprazole (PRILOSEC) 40 mg capsule Take 1 Cap by mouth daily. Indications: gastroesophageal reflux disease 30 Cap 3    enoxaparin (LOVENOX) 40 mg/0.4 mL 0.4 mL by SubCUTAneous route every twelve (12) hours every twelve (12) hours. 14 Syringe 0    multivitamin (ONE A DAY) tablet Take 1 Tab by mouth daily.          No Known Allergies    Review of Systems:  General - No history or complaints of unexpected fever or chills  Head/Neck - No history or complaints of headache or dizziness  Cardiac - No history or complaints of chest pain, palpitations, or shortness of breath  Pulmonary - No history or complaints of shortness of breath or productive cough  Gastrointestinal - as noted above  Genitourinary - No history or complaints of hematuria/dysuria or renal lithiasis  Musculoskeletal - No history or complaints of joint  muscular weakness  Hematologic - No history of any bleeding episodes  Neurologic - No history or complaints of  migraine headaches or neurologic symptoms    Objective:     Visit Vitals  /67 (BP 1 Location: Left arm, BP Patient Position: Sitting)   Pulse 63   Temp 97.6 °F (36.4 °C)   Ht 5' 11\" (1.803 m)   Wt 126 kg (277 lb 11.2 oz)   SpO2 100%   BMI 38.73 kg/m²       General:  alert, cooperative, no distress, appears stated age   Chest: lungs clear to auscultation, breath sounds equal and symmetric, no rhonchi, rales or wheezes, no accessory muscle use   Cor:   Regular rate and rhythm or without murmur or extra heart sounds   Abdomen: soft, bowel sounds active, non-tender, no masses or organomegaly   Incisions:   healing well, no drainage, no erythema, no hernia, no seroma, no swelling, no dehiscence, incision well approximated     Recent Results (from the past 2016 hour(s))   [address] [facility]. Collection Time: 07/03/19  1:54 PM   Result Value Ref Range    [facility] SPECIMEN COL Specimens collected/sent to [facility]     VITAMIN D, 25 HYDROXY    Collection Time: 07/03/19  1:55 PM   Result Value Ref Range    VITAMIN D, 25-HYDROXY 23.2 (L) 32.0 - 080.5 ng/mL   METABOLIC PANEL, COMPREHENSIVE    Collection Time: 07/03/19  1:55 PM   Result Value Ref Range    Glucose 98 70 - 99 mg/dL    BUN 17 6 - 22 mg/dL    Creatinine 0.8 0.5 - 1.2 mg/dL    Sodium 142 133 - 145 mmol/L    Potassium 5.5 3.5 - 5.5 mmol/L    Chloride 101 98 - 110 mmol/L    CO2 25 20 - 32 mmol/L    AST (SGOT) 16 10 - 37 U/L    ALT (SGPT) 12 5 - 40 U/L    Alk.  phosphatase 74 25 - 115 U/L    Bilirubin, total 0.4 0.2 - 1.2 mg/dL    Calcium 10.2 8.4 - 10.4 mg/dL    Protein, total 7.7 6.4 - 8.3 g/dL    Albumin 4.8 3.5 - 5.0 g/dL    A-G Ratio 1.7 1.1 - 2.6 ratio    Globulin 2.9 2.0 - 4.0 g/dL    Anion gap 16.0 mmol/L    GFRAA >60.0 >60.0    GFRNA >60.0 >60.0   FERRITIN    Collection Time: 07/03/19  1:55 PM   Result Value Ref Range    Ferritin 25 22 - 322 ng/mL   IRON    Collection Time: 07/03/19  1:55 PM   Result Value Ref Range    Iron 95 45 - 160 mcg/dL   VITAMIN B12 & FOLATE    Collection Time: 07/03/19  1:55 PM   Result Value Ref Range    Vitamin B12 905 211 - 911 pg/mL    Folate 10.62 >=3.10 ng/mL   CBC WITH AUTOMATED DIFF    Collection Time: 07/03/19  1:55 PM   Result Value Ref Range    WBC 7.3 4.0 - 11.0 K/uL    RBC 5.26 3.80 - 5.80 M/uL    HGB 15.0 13.2 - 17.3 g/dL    HCT 45.0 36.6 - 51.9 %    MCV 86 80 - 95 fL    MCH 29 26 - 34 pg    MCHC 33 31 - 36 g/dL    RDW 12.9 10.0 - 15.5 %    PLATELET 117 688 - 791 K/uL    MPV 10.9 9.0 - 13.0 fL    NEUTROPHILS 65 40 - 75 %    Lymphocytes 26 20 - 45 %    MONOCYTES 7 3 - 12 %    EOSINOPHILS 2 0 - 6 %    BASOPHILS 1 0 - 2 %    ABS. NEUTROPHILS 4.7 1.8 - 7.7 K/uL    ABSOLUTE LYMPHOCYTE COUNT 1.9 1.0 - 4.8 K/uL    ABS. MONOCYTES 0.5 0.1 - 1.0 K/uL    ABS. EOSINOPHILS 0.2 0.0 - 0.5 K/uL    ABS. BASOPHILS 0.0 0.0 - 0.2 K/uL   VITAMIN B1, WHOLE BLOOD    Collection Time: 07/03/19  1:55 PM   Result Value Ref Range    VITAMIN B1, WHOLE BLOOD 117.4 66.5 - 810.3 nmol/L   METABOLIC PANEL, COMPREHENSIVE    Collection Time: 08/05/19  8:00 AM   Result Value Ref Range    Glucose 87 70 - 99 mg/dL    BUN 15 6 - 22 mg/dL    Creatinine 0.8 0.5 - 1.2 mg/dL    Sodium 140 133 - 145 mmol/L    Potassium 4.7 3.5 - 5.5 mmol/L    Chloride 102 98 - 110 mmol/L    CO2 27 20 - 32 mmol/L    AST (SGOT) 19 10 - 37 U/L    ALT (SGPT) 17 5 - 40 U/L    Alk. phosphatase 73 25 - 115 U/L    Bilirubin, total 0.3 0.2 - 1.2 mg/dL    Calcium 10.1 8.4 - 10.4 mg/dL    Protein, total 7.6 6.4 - 8.3 g/dL    Albumin 4.7 3.5 - 5.0 g/dL    A-G Ratio 1.6 1.1 - 2.6 ratio    Globulin 2.9 2.0 - 4.0 g/dL    Anion gap 11.0 mmol/L    GFRAA >60.0 >60.0    GFRNA >60.0 >60.0   CBC WITH AUTOMATED DIFF    Collection Time: 08/05/19  8:00 AM   Result Value Ref Range    WBC 7.6 4.0 - 11.0 K/uL    RBC 5.20 3.80 - 5.80 M/uL    HGB 14.8 13.2 - 17.3 g/dL    HCT 44.8 36.6 - 51.9 %    MCV 86 80 - 95 fL    MCH 29 26 - 34 pg    MCHC 33 31 - 36 g/dL    RDW 13.3 10.0 - 15.5 %    PLATELET 800 671 - 374 K/uL    MPV 11.0 9.0 - 13.0 fL    NEUTROPHILS 58 40 - 75 %    Lymphocytes 31 20 - 45 %    MONOCYTES 9 3 - 12 %    EOSINOPHILS 2 0 - 6 %    BASOPHILS 1 0 - 2 %    ABS. NEUTROPHILS 4.4 1.8 - 7.7 K/uL    ABSOLUTE LYMPHOCYTE COUNT 2.4 1.0 - 4.8 K/uL    ABS. MONOCYTES 0.7 0.1 - 1.0 K/uL    ABS. EOSINOPHILS 0.1 0.0 - 0.5 K/uL    ABS.  BASOPHILS 0.1 0.0 - 0.2 K/uL    IMMATURE PLATELET FRACTION 2.4 1.1 - 7.1 % EKG, 12 LEAD, INITIAL    Collection Time: 08/05/19  2:38 PM   Result Value Ref Range    Ventricular Rate 60 BPM    Atrial Rate 60 BPM    P-R Interval 160 ms    QRS Duration 98 ms    Q-T Interval 392 ms    QTC Calculation (Bezet) 392 ms    Calculated P Axis 31 degrees    Calculated R Axis -4 degrees    Calculated T Axis 43 degrees    Diagnosis       Normal sinus rhythm  Moderate voltage criteria for LVH, may be normal variant  Borderline ECG  When compared with ECG of 08-JAN-2018 08:07,  No significant change was found  Confirmed by Nae Hall MD. ([id number]) on 8/5/2019 10:23:20 PM     TYPE & SCREEN    Collection Time: 08/05/19  4:00 PM   Result Value Ref Range    Crossmatch Expiration 08/19/2019     ABO/Rh(D) A NEGATIVE     Antibody screen NEG    [facility]. Collection Time: 08/05/19  4:01 PM   Result Value Ref Range    [facility] SPECIMEN COL Specimens collected/sent to [facility]     TYPE & SCREEN    Collection Time: 08/22/19  6:10 AM   Result Value Ref Range    Crossmatch Expiration 08/25/2019     ABO/Rh(D) A NEGATIVE     Antibody screen NEG    CBC W/O DIFF    Collection Time: 08/23/19  7:40 AM   Result Value Ref Range    WBC 7.9 4.6 - 13.2 K/uL    RBC 4.61 (L) 4.70 - 5.50 M/uL    HGB 12.8 (L) 13.0 - 16.0 g/dL    HCT 38.9 36.0 - 48.0 %    MCV 84.4 74.0 - 97.0 FL    MCH 27.8 24.0 - 34.0 PG    MCHC 32.9 31.0 - 37.0 g/dL    RDW 13.5 11.6 - 14.5 %    PLATELET 884 604 - 863 K/uL    MPV 9.7 9.2 - 11.8 FL       Pathology:  A: PARTIAL GASTRECTOMY:   NO HISTOPATHOLOGIC ABNORMALITY SEEN. B: STOMACH, CARDIA, BIOPSY:   NO HISTOPATHOLOGIC ABNORMALITY SEEN. NO EVIDENCE OF H. PYLORI SEEN ON H&E STAIN.     C: LIVER, WEDGE BIOPSY:   NO HISTOPATHOLOGIC ABNORMALITY SEEN. Assessment:   History of Morbid obesity, status post laparoscopic gastric bypass surgery. Doing well postoperatively. Sutures removed from all incision sites today.   Areas cleaned with alcohol swab after suture removal.  Increase fluid intake to >64oz daily or more of sugar free and caffeine free fluids. Exercise a minimum of 30 minutes daily. Patient is cleared to return to work. He states his boss is putting him on light duty for at least one week, maybe two when he returns. He has an appt in two days with the dietitian. I told him he can start eating the soft food diet now if he chooses. I reviewed a few of the soft foods with him that he can start trying. I made sure he was clear that drinking 64oz daily is his number one priority and that eating is not his main priority at this time. Kayleigh Phan was sent to pharmacy. Patient knows to wait two weeks before starting medication. Plan:     1. Increase activity to the goal of 30 minutes daily  2. Advance diet to soft solid phase. Reminded to measure portions, continue high protein, low carbohydrate diet. Reminded to eat regularly, to eat slowly & not to drink with meals. Refer to the handbook given in class. 3. Sleep goal is 7-9 hours each night. Patient education given on the effects of sleep deprivation  4. Continue multivitamin   5. Continue current medications and follow up with PCP for management of regimen. 6. Encouraged to attend support group   7. I have discussed this plan with patient and they verbalized understanding  8. Follow up in 2 weeks or sooner if patient has questions, concerns or worsening of condition, if unable to reach our office, patient should report to the ED. 9. Mr. Chalo Dalton has a reminder for a \"due or due soon\" health maintenance. I have asked that he contact his primary care provider for a follow-up on this health maintenance.

## 2019-09-03 NOTE — PATIENT INSTRUCTIONS
Patient Instructions      1. Remember hydration goals - minimum of 64 ounces of liquids per day (dehydration is the number one reason for hospital readmission). 2. Sleep 7-9 hours each night to keep your metabolism up. 3. Continue to monitor carbohydrate and protein intake you need a minimum of  Grams of protein daily- remember to keep your total carbohydrates to 50 grams or less per day for best results. 4. To maximize weight loss keep your caloric intake between 800-1,200 calories daily. If you are exercising excessively, such as training for a marathon, you need to keep a food log and meet with the dietician so they can advise you on your diet choices, carbohydrate intake and caloric intake. 5. Continue to work towards exercise goals - 60-90 minutes, 5 times a week minimum of deliberate, aerobic exercise is the ultimate goal with strength training 2 times each week. Refer to [employer] for  information. 6. Remember to take vitamins as directed in your handbook. 7. Attend support group the 2nd Thursday of each month. 8. Constipation: Milk of Magnesia is for immediate relief only. Miralax is to be used every day if constipation is a chronic problem. 9. Diarrhea: patients will occasionally develop lactose intolerance after surgery. Check to see if your protein shake has whey in it. If it does try a protein powder or drink that does not have whey and stop all yogurts, cheeses and milks to see if the diarrhea goes away. 10. If you have had labs drawn. We will only call you if you have abnormal results. Otherwise you can access the lab results in \"Linchpinhart\". You will only need the access code the first time you sign on. 11. Call us at [phone] or email us through [email or web address]" with questions,     concerns or worsening of condition, we have someone on call 24 hours a day.   If you are unable to reach our office, you are to go to your Primary Care Physician or the Emergency Department. Supplement Resource Guide    Importance of Protein:   Maintains lean body mass, produces antibodies to fight off infections, heals wounds, minimizes hair loss, helps to give you energy, helps with satiety, and keeping you full between meals. Importance of Calcium:  Needed for healthy bones and teeth, normal blood clotting, and nervous system functioning, higher risk of osteoporosis and bone disease with non-compliance. Importance of Multivitamins: Many functions. Supply you with extra nutrients that you may be missing from food. May lead to iron deficiency anemia, weakness, fatigue, and many other symptoms with non-compliance. Importance of B Vitamins:  Important for red blood cell formation, metabolism, energy, and helps to maintain a healthy nervous system. Protein Supplement  Liquid diet phase: consume 90-100g protein daily. Once you are eating consume  35-50g protein each day from your protein supplement. 0-3 g fat per serving  0-3 g sugar per serving    The body can only absorb 30g of protein at one time, so do not consume more than that at one time. Multi-vitamin Supplement:    Start immediately after surgery: any complete chewable, such as: Worths Complete chewables. Avoid Worth sours or gummies. They lack iron and other important nutrients and also have added sugar. Continue with a chewable vitamin or change to an adult complete multivitamin one month after surgery. Menstruating women can take a prenatal vitamin. Make sure it has at least 18 mg iron and 474-436 mcg folic acid Calcium Supplement:     Start taking within one month after surgery. Look for:   Calcium Citrate Plus D (1500 mg per day)    Recommend: Citracal    Avoid chocolate chewable calcium. Can use chewable bariatric or GNC brand or similar chewable.     The body cannot absorb more than 500-600 mg of calcium at one time.    Take for Life    Vitamin D  Take 3,000 international units daily Vitamin B12  B Complex Vitamin  Start taking both within one month after surgery. Vitamin B12 (sublingual): Take 1000 mcg of Vitamin B12 three times weekly    Must take sublingually (meaning you put it under your tongue) or in a liquid drop form for easy absorption. B Complex Vitamin:   Take one pill daily or liquid drop form daily; as directed on bottle.      Take for Life

## 2019-09-03 NOTE — PROGRESS NOTES
Lary Betancourt presents today for   Chief Complaint   Patient presents with    Follow-up     Pt is here today for his follow up       Is someone accompanying this pt? yes    Is the patient using any DME equipment during OV? no    Depression Screening:  3 most recent PHQ Screens 9/3/2019   Little interest or pleasure in doing things Not at all   Feeling down, depressed, irritable, or hopeless Not at all   Total Score PHQ 2 0       Learning Assessment:  Learning Assessment 9/3/2019   PRIMARY LEARNER Patient   HIGHEST LEVEL OF EDUCATION - PRIMARY LEARNER  [facility] PRIMARY LEARNER NONE   CO-LEARNER CAREGIVER No   PRIMARY LANGUAGE ENGLISH    NEED No   LEARNER PREFERENCE PRIMARY DEMONSTRATION   LEARNING SPECIAL TOPICS no   ANSWERED BY patient   RELATIONSHIP SELF       Abuse Screening:  Abuse Screening Questionnaire 9/3/2019   Do you ever feel afraid of your partner? N   Are you in a relationship with someone who physically or mentally threatens you? N   Is it safe for you to go home? Y       Fall Risk  No flowsheet data found. Coordination of Care:  1. Have you been to the ER, urgent care clinic since your last visit? Hospitalized since your last visit? no    2. Have you seen or consulted any other health care providers outside of the [address] [place] [address] since your last visit? Include any pap smears or colon screening.  no

## 2019-09-10 ENCOUNTER — CLINICAL SUPPORT (OUTPATIENT)
Dept: SURGERY | Age: 34
End: 2019-09-10

## 2019-09-10 DIAGNOSIS — Z98.84 S/P BARIATRIC SURGERY: Primary | ICD-10-CM

## 2019-09-10 NOTE — PROGRESS NOTES
Reviewed diet progression for weeks 3-4. Patient appears to have a good understanding of the diet progression, food choices, and dietary/exercise habits for successful weight loss and nourishment after surgery. The class material included: post-op diet progression, including soft/pureed high protein low fat, low sugar food recommendations; proper food group choices. We reviewed appropriate food choices, cooking techniques, and eating behavior modifications. Discussed intake regimen with 3 meals and 2-3 protein supplements per day. Reinforced the importance of adequate fluid with goal of 64 oz per day and adequate protein with goal of  grams per day.      Shira Feathers

## 2019-09-18 ENCOUNTER — OFFICE VISIT (OUTPATIENT)
Dept: SURGERY | Age: 34
End: 2019-09-18

## 2019-10-02 ENCOUNTER — OFFICE VISIT (OUTPATIENT)
Dept: SURGERY | Age: 34
End: 2019-10-02

## 2019-10-02 VITALS
HEART RATE: 60 BPM | OXYGEN SATURATION: 100 % | WEIGHT: 266.7 LBS | HEIGHT: 71 IN | SYSTOLIC BLOOD PRESSURE: 130 MMHG | BODY MASS INDEX: 37.34 KG/M2 | DIASTOLIC BLOOD PRESSURE: 70 MMHG | TEMPERATURE: 97.6 F

## 2019-10-02 DIAGNOSIS — Z98.84 S/P BARIATRIC SURGERY: ICD-10-CM

## 2019-10-02 DIAGNOSIS — K90.9 INTESTINAL MALABSORPTION, UNSPECIFIED TYPE: Primary | ICD-10-CM

## 2019-10-02 NOTE — PATIENT INSTRUCTIONS
Patient Instructions      1. Remember hydration goals - minimum of 64 ounces of liquids per day (dehydration is the number one reason for hospital readmission). 2. Sleep 7-9 hours each night to keep your metabolism up. 3. Continue to monitor carbohydrate and protein intake you need a minimum of  Grams of protein daily- remember to keep your total carbohydrates to 50 grams or less per day for best results. 4. To maximize weight loss keep your caloric intake between 800-1,200 calories daily. If you are exercising excessively, such as training for a marathon, you need to keep a food log and meet with the dietician so they can advise you on your diet choices, carbohydrate intake and caloric intake. 5. Continue to work towards exercise goals - 60-90 minutes, 5 times a week minimum of deliberate, aerobic exercise is the ultimate goal with strength training 2 times each week. Refer to [employer] for  information. 6. Remember to take vitamins as directed in your handbook. 7. Attend support group the 2nd Thursday of each month. 8. Constipation: Milk of Magnesia is for immediate relief only. Miralax is to be used every day if constipation is a chronic problem. 9. Diarrhea: patients will occasionally develop lactose intolerance after surgery. Check to see if your protein shake has whey in it. If it does try a protein powder or drink that does not have whey and stop all yogurts, cheeses and milks to see if the diarrhea goes away. 10. If you have had labs drawn. We will only call you if you have abnormal results. Otherwise you can access the lab results in \"[employer] In[employer]\". You will only need the access code the first time you sign on. 11. Call us at [phone] or email us through [email or web address]" with questions,     concerns or worsening of condition, we have someone on call 24 hours a day.   If you are unable to reach our office, you are to go to your Primary Care Physician or the Emergency Department. Supplement Resource Guide    Importance of Protein:   Maintains lean body mass, produces antibodies to fight off infections, heals wounds, minimizes hair loss, helps to give you energy, helps with satiety, and keeping you full between meals. Importance of Calcium:  Needed for healthy bones and teeth, normal blood clotting, and nervous system functioning, higher risk of osteoporosis and bone disease with non-compliance. Importance of Multivitamins: Many functions. Supply you with extra nutrients that you may be missing from food. May lead to iron deficiency anemia, weakness, fatigue, and many other symptoms with non-compliance. Importance of B Vitamins:  Important for red blood cell formation, metabolism, energy, and helps to maintain a healthy nervous system. Protein Supplement  Liquid diet phase: consume 90-100g protein daily. Once you are eating consume  35-50g protein each day from your protein supplement. 0-3 g fat per serving  0-3 g sugar per serving    The body can only absorb 30g of protein at one time, so do not consume more than that at one time. Multi-vitamin Supplement:    Start immediately after surgery: any complete chewable, such as: [place]s Complete chewables. Avoid [place] sours or gummies. They lack iron and other important nutrients and also have added sugar. Continue with a chewable vitamin or change to an adult complete multivitamin one month after surgery. Menstruating women can take a prenatal vitamin. Make sure it has at least 18 mg iron and 959-913 mcg folic acid Calcium Supplement:     Start taking within one month after surgery. Look for:   Calcium Citrate Plus D (1500 mg per day)    Recommend: Citracal    Avoid chocolate chewable calcium. Can use chewable bariatric or GNC brand or similar chewable.     The body cannot absorb more than 500-600 mg of calcium at one time.    Take for Life    Vitamin D  Take 3,000 international units daily Vitamin B12  B Complex Vitamin  Start taking both within one month after surgery. Vitamin B12 (sublingual): Take 1000 mcg of Vitamin B12 three times weekly    Must take sublingually (meaning you put it under your tongue) or in a liquid drop form for easy absorption. B Complex Vitamin:   Take one pill daily or liquid drop form daily; as directed on bottle.      Take for Life

## 2019-10-02 NOTE — PROGRESS NOTES
Subjective: Yessi Matias is a 28 y.o. male is now 1 months status post laparoscopic conversion of sleeve gastrectomy to gastric bypass surgery. Doing well overall. He has lost a total of 19 pounds since surgery. Body mass index is 37.2 kg/m². Has lost 15% of EBW. Currently on a soft food diet without difficulty, reports no issues and denies vomiting and abdominal pain. Taking in 60oz water daily. Sources of protein include eggs, yogurt, ground beef and beans. He has a very physical job and does not exercise outside of work. Patient is sleeping 6-8 hours a night on average. Bowel movements are regular. The patient is not having any pain. . The patient is compliant with multivitamins.      Weight Loss Metrics 10/2/2019 9/3/2019 8/22/2019 8/16/2019 8/8/2019 8/5/2019 7/16/2019   Today's Wt 266 lb 11.2 oz 277 lb 11.2 oz 286 lb 9 oz 282 lb 6 oz 280 lb 285 lb 12.8 oz 284 lb   BMI 37.2 kg/m2 38.73 kg/m2 39.97 kg/m2 39.38 kg/m2 39.05 kg/m2 39.86 kg/m2 39.61 kg/m2          Comorbidities:    Hypertension: not applicable  Diabetes: not applicable  Obstructive Sleep Apnea: not applicable  Hyperlipidemia: not applicable  Stress Urinary Incontinence: not applicable  Gastroesophageal Reflux: improved, on PPI  Weight related arthropathy:not applicable     Patient Active Problem List   Diagnosis Code    Back pain M54.9    Smoking history Z87.891    Strabismus H50.9    GERD (gastroesophageal reflux disease) K21.9    S/P bariatric surgery Z98.84    Intestinal malabsorption K90.9    Hypovitaminosis D E55.9        Past Medical History:   Diagnosis Date    Asthma     childhood    Back pain     GERD (gastroesophageal reflux disease)     Intestinal malabsorption     S/P bariatric surgery     Smoking history     quit 2013    Strabismus        Past Surgical History:   Procedure Laterality Date    HX ABDOMINOPLASTY      2/2018    HX GI  03/15/2016    gastric sleeve    HX WISDOM TEETH EXTRACTION Current Outpatient Medications   Medication Sig Dispense Refill    multivitamin with iron (FLINTSTONES) chewable tablet Take 2 Tabs by mouth daily.  ergocalciferol (VITAMIN D2) 50,000 unit capsule Take 50,000 Units by mouth.  ursodiol (JOSE LUIS FORTE) 500 mg tablet Take 1 Tab by mouth daily for 30 days. 30 Tab 4    cyanocobalamin (VITAMIN B12) 1,000 mcg/mL injection 1 mL by SubCUTAneous route every thirty (30) days. Indications: Prevention of Vitamin B12 Deficiency 3 mL 4    Syringe with Needle, Disp, 3 mL 25 x 5/8\" syrg 1 mL by SubCUTAneous route every thirty (30) days. 15 Syringe 0    omeprazole (PRILOSEC) 40 mg capsule Take 1 Cap by mouth daily.  Indications: gastroesophageal reflux disease 30 Cap 3       No Known Allergies    Review of Systems:  General - No history or complaints of unexpected fever or chills  Head/Neck - No history or complaints of headache or dizziness  Cardiac - No history or complaints of chest pain, palpitations, or shortness of breath  Pulmonary - No history or complaints of shortness of breath or productive cough  Gastrointestinal - as noted above  Genitourinary - No history or complaints of hematuria/dysuria or renal lithiasis  Musculoskeletal - No history or complaints of joint  muscular weakness  Hematologic - No history of any bleeding episodes  Neurologic - No history or complaints of  migraine headaches or neurologic symptoms    Objective:     Visit Vitals  /70 (BP 1 Location: Right arm, BP Patient Position: Sitting)   Pulse 60   Temp 97.6 °F (36.4 °C)   Ht 5' 11\" (1.803 m)   Wt 121 kg (266 lb 11.2 oz)   SpO2 100%   BMI 37.20 kg/m²       General:  alert, cooperative, no distress, appears stated age   Chest: lungs clear to auscultation, breath sounds equal and symmetric, no rhonchi, rales or wheezes, no accessory muscle use   Cor:   Regular rate and rhythm or without murmur or extra heart sounds   Abdomen: soft, bowel sounds active, non-tender, no masses or organomegaly   Incisions:   healing well, no drainage, no erythema, no hernia, no seroma, no swelling, no dehiscence, incision well approximated       Recent Results (from the past 2016 hour(s))   METABOLIC PANEL, COMPREHENSIVE    Collection Time: 08/05/19  8:00 AM   Result Value Ref Range    Glucose 87 70 - 99 mg/dL    BUN 15 6 - 22 mg/dL    Creatinine 0.8 0.5 - 1.2 mg/dL    Sodium 140 133 - 145 mmol/L    Potassium 4.7 3.5 - 5.5 mmol/L    Chloride 102 98 - 110 mmol/L    CO2 27 20 - 32 mmol/L    AST (SGOT) 19 10 - 37 U/L    ALT (SGPT) 17 5 - 40 U/L    Alk. phosphatase 73 25 - 115 U/L    Bilirubin, total 0.3 0.2 - 1.2 mg/dL    Calcium 10.1 8.4 - 10.4 mg/dL    Protein, total 7.6 6.4 - 8.3 g/dL    Albumin 4.7 3.5 - 5.0 g/dL    A-G Ratio 1.6 1.1 - 2.6 ratio    Globulin 2.9 2.0 - 4.0 g/dL    Anion gap 11.0 mmol/L    GFRAA >60.0 >60.0    GFRNA >60.0 >60.0   CBC WITH AUTOMATED DIFF    Collection Time: 08/05/19  8:00 AM   Result Value Ref Range    WBC 7.6 4.0 - 11.0 K/uL    RBC 5.20 3.80 - 5.80 M/uL    HGB 14.8 13.2 - 17.3 g/dL    HCT 44.8 36.6 - 51.9 %    MCV 86 80 - 95 fL    MCH 29 26 - 34 pg    MCHC 33 31 - 36 g/dL    RDW 13.3 10.0 - 15.5 %    PLATELET 898 686 - 546 K/uL    MPV 11.0 9.0 - 13.0 fL    NEUTROPHILS 58 40 - 75 %    Lymphocytes 31 20 - 45 %    MONOCYTES 9 3 - 12 %    EOSINOPHILS 2 0 - 6 %    BASOPHILS 1 0 - 2 %    ABS. NEUTROPHILS 4.4 1.8 - 7.7 K/uL    ABSOLUTE LYMPHOCYTE COUNT 2.4 1.0 - 4.8 K/uL    ABS. MONOCYTES 0.7 0.1 - 1.0 K/uL    ABS. EOSINOPHILS 0.1 0.0 - 0.5 K/uL    ABS.  BASOPHILS 0.1 0.0 - 0.2 K/uL    IMMATURE PLATELET FRACTION 2.4 1.1 - 7.1 %   EKG, 12 LEAD, INITIAL    Collection Time: 08/05/19  2:38 PM   Result Value Ref Range    Ventricular Rate 60 BPM    Atrial Rate 60 BPM    P-R Interval 160 ms    QRS Duration 98 ms    Q-T Interval 392 ms    QTC Calculation (Bezet) 392 ms    Calculated P Axis 31 degrees    Calculated R Axis -4 degrees    Calculated T Axis 43 degrees    Diagnosis       Normal sinus rhythm  Moderate voltage criteria for LVH, may be normal variant  Borderline ECG  When compared with ECG of 08-JAN-2018 08:07,  No significant change was found  Confirmed by Yolanda Nguyen MD. ([id number]) on 8/5/2019 10:23:20 PM     TYPE & SCREEN    Collection Time: 08/05/19  4:00 PM   Result Value Ref Range    Crossmatch Expiration 08/19/2019     ABO/Rh(D) A NEGATIVE     Antibody screen NEG    [facility]. Collection Time: 08/05/19  4:01 PM   Result Value Ref Range    [facility] Specimens collected/sent to [facility]     TYPE & SCREEN    Collection Time: 08/22/19  6:10 AM   Result Value Ref Range    Crossmatch Expiration 08/25/2019     ABO/Rh(D) A NEGATIVE     Antibody screen NEG    CBC W/O DIFF    Collection Time: 08/23/19  7:40 AM   Result Value Ref Range    WBC 7.9 4.6 - 13.2 K/uL    RBC 4.61 (L) 4.70 - 5.50 M/uL    HGB 12.8 (L) 13.0 - 16.0 g/dL    HCT 38.9 36.0 - 48.0 %    MCV 84.4 74.0 - 97.0 FL    MCH 27.8 24.0 - 34.0 PG    MCHC 32.9 31.0 - 37.0 g/dL    RDW 13.5 11.6 - 14.5 %    PLATELET 984 797 - 899 K/uL    MPV 9.7 9.2 - 11.8 FL       Assessment:   History of Morbid obesity, status post laparoscopic conversion of sleeve gastrectomy to gastric bypass surgery. Doing well postoperatively. Sleep goal is 7-9 hours each night. Patient education given on the effects of sleep deprivation on weight control. Hypovitaminosis D - Continue meds and follow up with PCP    Plan:       1. Start 500mg [place] [employer] today, stop after 6 months out from surgery. 2. Pt is cleared to return to work without restrictions. 3. Can stop probiotic    4. Sleep goal is 7-9 hours a night. Patient education given on the effects of sleep deprivation on weight control. 5. Advance diet to solid phase. Reminded to measure portions, continue high protein, low carbohydrate diet. Reminded to eat regularly, to eat slowly & not to drink with meals. Refer to the handbook given in class.   6. Patient has appt with dietician directly after this visit. 7. Continue multivitamin and add the additional vitamin supplementation (Ca, B complex, B12, D, all listed in handbook)  8. Continue current medications and follow up with PCP for management of regimen. 9. Continue cardio exercise and add resistance exercises. Discussed with patient. Minimum of 30 minutes of exercise daily. 10. Encouraged to attend support group   11. I have discussed this plan with patient and they verbalized understanding  12. Follow up in 1 months or sooner if patient has questions, concerns or worsening of condition, if unable to reach our office, patient should report to the ED. 15. Mr. Ruddy Bullard has a reminder for a \"due or due soon\" health maintenance. I have asked that he contact his primary care provider for a follow-up on this health maintenance.

## 2019-10-02 NOTE — PROGRESS NOTES
Shruthi Jerome presents today for   Chief Complaint   Patient presents with    Follow-up     Pt is here today for his follow up       Is someone accompanying this pt? yes    Is the patient using any DME equipment during OV? no    Depression Screening:  3 most recent PHQ Screens 10/2/2019   Little interest or pleasure in doing things Not at all   Feeling down, depressed, irritable, or hopeless Not at all   Total Score PHQ 2 0       Learning Assessment:  Learning Assessment 9/3/2019   PRIMARY LEARNER Patient   HIGHEST LEVEL OF EDUCATION - PRIMARY LEARNER  GRADUATED HIGH SCHOOL OR GED   BARRIERS PRIMARY LEARNER NONE   CO-LEARNER CAREGIVER No   PRIMARY LANGUAGE ENGLISH    NEED No   LEARNER PREFERENCE PRIMARY DEMONSTRATION   LEARNING SPECIAL TOPICS no   ANSWERED BY patient   RELATIONSHIP SELF       Abuse Screening:  Abuse Screening Questionnaire 10/2/2019   Do you ever feel afraid of your partner? N   Are you in a relationship with someone who physically or mentally threatens you? N   Is it safe for you to go home? Y       Fall Risk  No flowsheet data found. Coordination of Care:  1. Have you been to the ER, urgent care clinic since your last visit? Hospitalized since your last visit? no    2. Have you seen or consulted any other health care providers outside of the [address] [place] [address] since your last visit? Include any pap smears or colon screening.  no

## 2019-10-22 RX ORDER — OMEPRAZOLE 40 MG/1
CAPSULE, DELAYED RELEASE ORAL
Qty: 30 CAP | Refills: 3 | Status: SHIPPED | OUTPATIENT
Start: 2019-10-22 | End: 2020-03-10

## 2020-03-10 RX ORDER — OMEPRAZOLE 40 MG/1
CAPSULE, DELAYED RELEASE ORAL
Qty: 30 CAP | Refills: 3 | Status: SHIPPED | OUTPATIENT
Start: 2020-03-10

## 2020-07-21 ENCOUNTER — DOCUMENTATION ONLY (OUTPATIENT)
Dept: SURGERY | Age: 35
End: 2020-07-21

## 2020-07-21 NOTE — LETTER
[facility] Surgical Specialist 
[address] [facility], [address] [facility] Surgical Specialists [facility] 
 
 
Dear Patient, Your health is our main concern. It is important for your health to have follow-up lab work and to see your surgeon at 2 months, 4 months, 6 months, 9 months and annually after your weight loss surgery. Additionally, the Department of Bariatric Surgery at our hospital is a member of the [employer] [address] Surgical Quality Improvement Program ([facility] NSQIP). As a participant in this program, we gather information on the outcomes of our patients after surgery. Please call the office for a follow up appointment at [phone]. If you have moved out of the area or have changed surgeons please call us and let us know the name of your doctor. Your health and feedback are important to us. We greatly appreciate your response. Thank you, [facility]

## 2020-07-21 NOTE — PROGRESS NOTES
Per [facility] requirements;  E-mail and letter sent for follow up appointment. Halina Melendez Surgical Specialist  [address] [facility], [address] [facility]                 Halina Melendez Weight Loss [place]  [facility] Surgical Specialists  [facility]      Dear Patient,    Your health is our main concern. It is important for your health to have follow-up lab work and to see your surgeon at 2 months, 4 months, 6 months, 9 months and annually after your weight loss surgery. Additionally, the Department of Bariatric Surgery at our hospital is a member of the [employer] of [address] [place] [address] Surgical Quality Improvement Program ([facility] NSQIP). As a participant in this program, we gather information on the outcomes of our patients after surgery. Please call the office for a follow up appointment at [phone]. If you have moved out of the area or have changed surgeons please call us and let us know the name of your doctor. Your health and feedback are important to us. We greatly appreciate your response.        Thank you,  Halina France Andrei Loss [address] [facility]

## 2021-07-15 ENCOUNTER — DOCUMENTATION ONLY (OUTPATIENT)
Dept: SURGERY | Age: 36
End: 2021-07-15

## 2021-07-15 NOTE — PROGRESS NOTES
Per [facility] requirements;  E-mail and letter sent for follow up appointment. [employer] Surgical Specialist  [address] [place] [address]   Darby Walters, [address] [facility] Weight Loss [place]  [facility] Surgical Specialists  [facility]      Dear Patient,    Your health is our main concern. It is important for your health to have follow-up lab work and to see your surgeon at 2 months, 4 months, 6 months, 9 months and annually after your weight loss surgery. Additionally, the Department of Bariatric Surgery at our hospital is a member of the Metabolic and Bariatric Surgery Accreditation and Quality Improvement Program [facility]). As a participant in this program, we gather information on the outcomes of our patients after surgery. Please call the office for a follow up appointment at [phone]. If you have moved out of the area or have changed surgeons please call us and let us know the name of your doctor. Your health and feedback are important to us. We greatly appreciate your response.        Thank you,  [employer] [facility] Riley Loss [address] [facility]

## (undated) DEVICE — STRAP,POSITIONING,KNEE/BODY,FOAM,4X60": Brand: MEDLINE

## (undated) DEVICE — STERILE POLYISOPRENE POWDER-FREE SURGICAL GLOVES: Brand: PROTEXIS

## (undated) DEVICE — ENDOCUT SCISSOR TIP, DISPOSABLE: Brand: RENEW

## (undated) DEVICE — KENDALL SCD EXPRESS SLEEVES, KNEE LENGTH, MEDIUM: Brand: KENDALL SCD

## (undated) DEVICE — MEDI-VAC NON-CONDUCTIVE SUCTION TUBING: Brand: CARDINAL HEALTH

## (undated) DEVICE — GARMENT,MEDLINE,DVT,INT,CALF,MED, GEN2: Brand: MEDLINE

## (undated) DEVICE — SHEAR HARMONIC 5MMX45CM -- ACE 7+

## (undated) DEVICE — APPLIER CLP L SHFT DIA12MM 20 ROT MULT LIGACLP

## (undated) DEVICE — BINDER ABD H12IN FOR 30-45IN WAIST UNIV 4 PNL PREM DSGN E

## (undated) DEVICE — VISUALIZATION SYSTEM: Brand: CLEARIFY

## (undated) DEVICE — TIP APPL L35CM RIG FOR SEAL EVICEL

## (undated) DEVICE — SYR 3ML LL TIP 1/10ML GRAD --

## (undated) DEVICE — STAPLER INT L37CM STPL 21MM CIR ENDOSCP CRV INTLUMN B FRM

## (undated) DEVICE — SOLUTION LACTATED RINGERS INJECTION USP

## (undated) DEVICE — SUT MCRYL + 3-0 27IN PS1 UD --

## (undated) DEVICE — HEX-LOCKING BLADE ELECTRODE: Brand: EDGE

## (undated) DEVICE — BARIATRIC: Brand: MEDLINE INDUSTRIES, INC.

## (undated) DEVICE — SUTURE PDS II SZ 0 L27IN ABSRB VLT L26MM CT-2 1/2 CIR Z334H

## (undated) DEVICE — Device

## (undated) DEVICE — DISPOSABLE SUCTION/IRRIGATOR TUBE SET WITH TIP: Brand: AHTO

## (undated) DEVICE — CLIP SUT ENDOSCP F/2-0/3-0/4-0 -- LAPRA-TY

## (undated) DEVICE — PREP SKN PREVAIL 40ML APPL --

## (undated) DEVICE — SUTURE PDS II SZ 0 L27IN ABSRB VLT L36MM CT-1 1/2 CIR Z340H

## (undated) DEVICE — 4-PORT MANIFOLD: Brand: NEPTUNE 2

## (undated) DEVICE — SUT PROL 0 30IN CT1 BLU --

## (undated) DEVICE — DRAIN SURG 15FR L3/16IN SIL RND 3/4 FLUT 3/16IN TRCR

## (undated) DEVICE — SYR IRR CATH TIP LR ADPT 70ML -- CONVERT TO ITEM 363120

## (undated) DEVICE — SUT PROL 2-0 30IN CT2 BLU --

## (undated) DEVICE — PAD,ABDOMINAL,5"X9",STERILE,LF,1/PK: Brand: MEDLINE INDUSTRIES, INC.

## (undated) DEVICE — SOL IRRIGATION INJ NACL 0.9% 500ML BTL

## (undated) DEVICE — AGENT HEMSTAT W6XL9IN OXIDIZED REGENERATED CELOS ABSRB FOR

## (undated) DEVICE — ENDO CARRY-ON PROCEDURE KIT INCLUDES ENZYMATIC SPONGE, GAUZE, BIOHAZARD LABEL, TRAY, LUBRICANT, DIRTY SCOPE LABEL, WATER LABEL, TRAY, DRAWSTRING PAD, AND DEFENDO 4-PIECE KIT.: Brand: ENDO CARRY-ON PROCEDURE KIT

## (undated) DEVICE — NEEDLE INSUF L150MM DIA2MM DISP FOR PNEUMOPERI ENDOPATH

## (undated) DEVICE — TRAY CATH OD16FR SIL URIN M STATLOK STBL DEV SURSTP

## (undated) DEVICE — CUTTER ENDOSCP L340MM LIN ARTC SGL STROKE FIRING ENDOPATH

## (undated) DEVICE — SUTURE ETHLN SZ 3-0 L30IN NONABSORBABLE BLK FSL L30MM 3/8 1671H

## (undated) DEVICE — BAG SPEC RETRV 275ML 10ML DISPOSABLE RELIACATCH

## (undated) DEVICE — SUT MONOCRYL PLUS UD 4-0 --

## (undated) DEVICE — INTENDED FOR TISSUE SEPARATION, AND OTHER PROCEDURES THAT REQUIRE A SHARP SURGICAL BLADE TO PUNCTURE OR CUT.: Brand: BARD-PARKER ® CARBON RIB-BACK BLADES

## (undated) DEVICE — TROCAR ENDOSCP L100MM DIA15MM BLDELSS STBL SL ENDOPATH XCEL

## (undated) DEVICE — SHEET,DRAPE,40X58,STERILE: Brand: MEDLINE

## (undated) DEVICE — MAJ-1414 SINGLE USE ADPATER BIOPSY VALV: Brand: SINGLE USE ADAPTOR BIOPSY VALVE

## (undated) DEVICE — MAJOR: Brand: MEDLINE INDUSTRIES, INC.

## (undated) DEVICE — TROCAR ENDOSCP BLDELSS 12X100 MM W/ HNDL STBL SL OPT TIP

## (undated) DEVICE — AMD ANTIMICROBIAL DRAIN SPONGES, 6 PLY, 0.2% POLYHEXAMETHYLENE BIGUANIDE HCI (PHMB): Brand: EXCILON

## (undated) DEVICE — GOWN,AURORA,NONRNF,XL,30/CS: Brand: MEDLINE

## (undated) DEVICE — RELOAD STPL H4.1X2MM DIA60MM THCK TISS GRN 6 ROW PWR GST B

## (undated) DEVICE — RELOAD STPL L60MM H1-2.6MM MESENTERY THN TISS WHT 6 ROW

## (undated) DEVICE — FORCEPS BX OVL CUP SERR DISP CAP L 240CM RAD JAW 4

## (undated) DEVICE — SLEEVE TRCR L100MM DIA5MM UNIV STBL FOR BLDELSS DIL TIP

## (undated) DEVICE — (D)PREP SKN CHLRAPRP APPL 26ML -- CONVERT TO ITEM 371833

## (undated) DEVICE — TROCAR LAP L100MM DIA5MM BLDELSS W/ STBL SL ENDOPATH XCEL

## (undated) DEVICE — SUTURE ETHIB EXCL BR GRN TAPR PT 2-0 30 X563H X563H

## (undated) DEVICE — DRESSING,GAUZE,XEROFORM,CURAD,1"X8",ST: Brand: CURAD

## (undated) DEVICE — RELOAD STPL H1-2.5X45MM VASC THN TISS WHT 6 ROW B FRM SGL

## (undated) DEVICE — [HIGH FLOW INSUFFLATOR,  DO NOT USE IF PACKAGE IS DAMAGED,  KEEP DRY,  KEEP AWAY FROM SUNLIGHT,  PROTECT FROM HEAT AND RADIOACTIVE SOURCES.]: Brand: PNEUMOSURE

## (undated) DEVICE — SUTURE VCRL + SZ 3-0 L18IN ABSRB UD SH 1/2 CIR TAPERCUT NDL VCP864D

## (undated) DEVICE — TROCAR ENDOSCP L100MM DIA12MM STBL SL BLDELSS ENDOPATH XCEL

## (undated) DEVICE — AGENT HEMSTAT W4XL4IN OXIDIZED REGENERATED CELOS STRUCTURED

## (undated) DEVICE — 3-0 COATED VICRYL PLUS UNDYED 1X27" SH --

## (undated) DEVICE — SEALANT HEMSTAT 5ML HUM FIBRIN THROM 2 VI APPL DEV EVICEL

## (undated) DEVICE — STAPLER SKIN L440MM 32MM LNG 12 FIRING B FRM PWR + GRIPPING

## (undated) DEVICE — CATHETER JEJUSTMY AD 16FR 15CC L108IN THMB VLV FOR DCOMPR

## (undated) DEVICE — GOWN ISOLATN REG BLU POLY UNISX W/ THMB LOOP